# Patient Record
Sex: FEMALE | Race: BLACK OR AFRICAN AMERICAN
[De-identification: names, ages, dates, MRNs, and addresses within clinical notes are randomized per-mention and may not be internally consistent; named-entity substitution may affect disease eponyms.]

---

## 2018-05-09 ENCOUNTER — HOSPITAL ENCOUNTER (INPATIENT)
Dept: HOSPITAL 97 - ER | Age: 73
LOS: 9 days | Discharge: NURSING, REHAB OR LONG-TERM CARE | DRG: 603 | End: 2018-05-18
Attending: FAMILY MEDICINE | Admitting: HOSPITALIST
Payer: COMMERCIAL

## 2018-05-09 VITALS — BODY MASS INDEX: 31.7 KG/M2

## 2018-05-09 DIAGNOSIS — Z86.73: ICD-10-CM

## 2018-05-09 DIAGNOSIS — I95.9: ICD-10-CM

## 2018-05-09 DIAGNOSIS — E66.01: ICD-10-CM

## 2018-05-09 DIAGNOSIS — E05.90: ICD-10-CM

## 2018-05-09 DIAGNOSIS — I89.0: ICD-10-CM

## 2018-05-09 DIAGNOSIS — Z16.12: ICD-10-CM

## 2018-05-09 DIAGNOSIS — L03.115: ICD-10-CM

## 2018-05-09 DIAGNOSIS — I10: ICD-10-CM

## 2018-05-09 DIAGNOSIS — E87.3: ICD-10-CM

## 2018-05-09 DIAGNOSIS — N39.0: ICD-10-CM

## 2018-05-09 DIAGNOSIS — L03.116: Primary | ICD-10-CM

## 2018-05-09 DIAGNOSIS — I48.2: ICD-10-CM

## 2018-05-09 DIAGNOSIS — B96.20: ICD-10-CM

## 2018-05-09 DIAGNOSIS — J41.8: ICD-10-CM

## 2018-05-09 LAB
BUN BLD-MCNC: 23 MG/DL (ref 6–20)
GLUCOSE SERPLBLD-MCNC: 118 MG/DL (ref 65–120)
HCT VFR BLD CALC: 28.9 % (ref 36–45)
LYMPHOCYTES # SPEC AUTO: 1.4 K/UL (ref 0.7–4.9)
MCH RBC QN AUTO: 27.5 PG (ref 27–35)
MCV RBC: 83.6 FL (ref 80–100)
PMV BLD: 8.6 FL (ref 7.6–11.3)
POTASSIUM SERPL-SCNC: 3.5 MEQ/L (ref 3.6–5)
RBC # BLD: 3.46 M/UL (ref 3.86–4.86)

## 2018-05-09 PROCEDURE — 82962 GLUCOSE BLOOD TEST: CPT

## 2018-05-09 PROCEDURE — 82805 BLOOD GASES W/O2 SATURATION: CPT

## 2018-05-09 PROCEDURE — 71045 X-RAY EXAM CHEST 1 VIEW: CPT

## 2018-05-09 PROCEDURE — 85025 COMPLETE CBC W/AUTO DIFF WBC: CPT

## 2018-05-09 PROCEDURE — 81015 MICROSCOPIC EXAM OF URINE: CPT

## 2018-05-09 PROCEDURE — 84100 ASSAY OF PHOSPHORUS: CPT

## 2018-05-09 PROCEDURE — 84132 ASSAY OF SERUM POTASSIUM: CPT

## 2018-05-09 PROCEDURE — 83735 ASSAY OF MAGNESIUM: CPT

## 2018-05-09 PROCEDURE — 87077 CULTURE AEROBIC IDENTIFY: CPT

## 2018-05-09 PROCEDURE — 96375 TX/PRO/DX INJ NEW DRUG ADDON: CPT

## 2018-05-09 PROCEDURE — 87086 URINE CULTURE/COLONY COUNT: CPT

## 2018-05-09 PROCEDURE — 87186 SC STD MICRODIL/AGAR DIL: CPT

## 2018-05-09 PROCEDURE — 80053 COMPREHEN METABOLIC PANEL: CPT

## 2018-05-09 PROCEDURE — 96365 THER/PROPH/DIAG IV INF INIT: CPT

## 2018-05-09 PROCEDURE — 87088 URINE BACTERIA CULTURE: CPT

## 2018-05-09 PROCEDURE — 99285 EMERGENCY DEPT VISIT HI MDM: CPT

## 2018-05-09 PROCEDURE — 86140 C-REACTIVE PROTEIN: CPT

## 2018-05-09 PROCEDURE — 87040 BLOOD CULTURE FOR BACTERIA: CPT

## 2018-05-09 PROCEDURE — 36415 COLL VENOUS BLD VENIPUNCTURE: CPT

## 2018-05-09 PROCEDURE — 81003 URINALYSIS AUTO W/O SCOPE: CPT

## 2018-05-09 PROCEDURE — 97163 PT EVAL HIGH COMPLEX 45 MIN: CPT

## 2018-05-09 PROCEDURE — 80048 BASIC METABOLIC PNL TOTAL CA: CPT

## 2018-05-09 RX ADMIN — HUMAN INSULIN SCH: 100 INJECTION, SOLUTION SUBCUTANEOUS at 21:36

## 2018-05-09 RX ADMIN — Medication SCH ML: at 22:00

## 2018-05-09 NOTE — P.HP
Certification for Inpatient


Patient admitted to: Observation


With expected LOS: <2 Midnights


Patient will require the following post-hospital care: None


Practitioner: I am a practitioner with admitting privileges, knowledge of 

patient current condition, hospital course, and medical plan of care.


Services: Services provided to patient in accordance with Admission 

requirements found in Title 42 Section 412.3 of the Code of Federal Regulations





Patient History


Date of Service: 05/09/18


Primary Care Provider: None


Reason for admission: Cellulitis 


History of Present Illness: 





72-year-old female with significant past medical history of hypertension, AFib, 

COPD, history of CVA, who presented to the ER complaining of having some 

bilateral swelling has gotten worse over 1-2 weeks.  Patient stated that she 

started noticing a healer sore on the right heel which started hurting her more 

and her pain was radiating down from her breast leg all the way up to her 

pelvis area.  Patient does decided to come to the ER to get a further checked 

out.  Patient denies having any nausea vomiting fever chills or any other 

associated symptoms at this time.  Aside from bilateral swelling and pain.  

Patient does not have any other complaints to offer.  Patient is a poor 

historian who live by herself at home and does not have a good followup as 

well.  Patient it was a nursing home for rehab however was discharged from the 

nursing home when she was appropriate from rehab.





In the ER patient was found to have bilateral lower extremity cellulitis with 

mildly elevated white count and thus was referred over for admission for 

further care. 


Allergies





codeine Allergy (Mild, Verified 09/02/15 16:09)


 Rash


No Known Allergies Allergy (Uncoded 09/10/16 17:37)


 Unknown





Home Medications: 








Carvedilol [Coreg*] 25 mg PO BID 09/02/15 


Cyanocobalamin [Vitamin B-12*] 1,000 mcg IJ ONCE 09/02/15 


Nitroglycerin [Nitrostat*] 1 tab SL PRN PRN 09/02/15 


Tramadol HCl [Ultram] 50 mg PO BID PRN 09/02/15 


Triamcinolone 0.1% Crm [Kenalog 0.1% Cream*] 15 appl TOP TID 09/02/15 


Carvedilol [Coreg*] 6.25 mg PO BID #60 tab 03/26/18 


Furosemide [Lasix*] 40 mg PO DAILY #30 tab 03/26/18 


Ipratropium Neb [Atrovent*] 0.5 mg NEB Q6HP PRN #30 amp 03/26/18 


Isosorbide Mononitrate [Isosorbide Mononitrate ER] 60 mg PO DAILY #30 

tab.er.24h 03/26/18 


Mometasone/Formoterol [Dulera 100 Mcg/5 Mcg Inhaler] 2 puff IH BID #1 inhaler 03 /26/18 


Pantoprazole [Protonix Tab*] 40 mg PO DAILYAC #30 tab 03/26/18 


Potassium Chloride [Klor-Con] 40 meq PO DAILY #60 packet 03/26/18 


Rivaroxaban [Xarelto*] 20 mg PO DAILY #30 tablet 03/26/18 


Smz./Tmp. [Bactrim Ds 800 MG/160 MG*] 1 tab PO BID #20 tab 03/26/18 


Tizanidine [Zanaflex*] 2 mg PO BID #60 tab 03/26/18 








- Past Medical/Surgical History


Diabetic: Yes


-: stroke with left sided weakness


-: COPD


-: Venous insufficiency


-: benign hypertension


-: peripheral neuropathy


-: lymphedema


-: urinary incontinence


-: tubal ligation


-: myomectomy


-: bowel obstruction surgery x 2


-: sialolith-s/p excision.





- Family History


  ** Father


-: Heart disease





- Social History


Alcohol use: No


CD- Drugs: No


Caffeine use: No





Review of Systems


General: As per HPI





Physical Examination





- Physical Exam


General: Alert, In no apparent distress, Oriented x3


HEENT: Atraumatic


Neck: Supple


Respiratory: Clear to auscultation bilaterally, Normal air movement


Cardiovascular: Regular rate/rhythm, Normal S1 S2


Gastrointestinal: Normal bowel sounds, Soft and benign, Non-distended, No 

tenderness


Musculoskeletal: Swelling (Swelling BL LE. Erythema noted BL LE as well. No 

active wound noted on the left. Heel Ulcer noted on the right stage 2. )


Integumentary: No rashes, Tenderness/swelling, Erythema


Neurological: Normal speech, Normal strength at 5/5 x4 extr, Normal tone


Lymphatics: No axilla or inguinal lymphadenopathy





Assessment and Plan





- Problems (Diagnosis)


(1) Cellulitis


Current Visit: Yes   Status: Acute   


Plan: 


BL LE cellulitis most likely 2/2 to chronic swelling 


-IV vanc and zosyn 


-MRI to rule out osteomylitis on the right heel 


-Blood cutlure pending


Qualifiers: 


   Site of cellulitis: extremity   Site of cellulitis of extremity: lower 

extremity   Laterality: unspecified laterality   Qualified Code(s): L03.119 - 

Cellulitis of unspecified part of limb   





(2) Lymphedema


Current Visit: No   Status: Chronic   





(3) Atrial fibrillation


Onset Date: 03/19/18   Current Visit: No   Status: Chronic   


Qualifiers: 


   Atrial fibrillation type: chronic   Qualified Code(s): I48.2 - Chronic 

atrial fibrillation   





(4) COPD (chronic obstructive pulmonary disease)


Onset Date: 03/19/18   Current Visit: No   Status: Chronic   


Qualifiers: 


   COPD type: chronic bronchitis   Chronic bronchitis type: mixed simple and 

mucopurulent   Qualified Code(s): J41.8 - Mixed simple and mucopurulent chronic 

bronchitis   





(5) History of CVA (cerebrovascular accident)


Onset Date: 03/19/18   Current Visit: No   Status: Chronic   





(6) Hypertension


Onset Date: 03/19/18   Current Visit: No   Status: Chronic   


Qualifiers: 


   Hypertension type: essential hypertension 





(7) Hyperthyroidism


Onset Date: 03/19/18   Current Visit: No   Status: Chronic   





(8) Obesity


Onset Date: 03/19/18   Current Visit: No   Status: Chronic   


Qualifiers: 


   Obesity type: due to excess calories   Obesity classification: adult class 3 

(BMI >= 40)   Serious obesity comorbidity presence: with serious comorbidity   

Body mass index: BMI 45.0-49.9   Qualified Code(s): E66.01 - Morbid (severe) 

obesity due to excess calories; Z68.42 - Body mass index (BMI) 45.0-49.9, adult

   


Discharge Plan: Home


Plan to discharge in: 24 Hours





- Advance Directives


Does patient have a Living Will: No


Does patient have a Durable POA for Healthcare: No





- Code Status/Comfort Care


Code Status Assessed: Yes


Critical Care: No

## 2018-05-09 NOTE — ER
Nurse's Notes                                                                                     

 [facility]                                                                

Name: Maryellen Nolan                                                                                   

Age: 72 yrs                                                                                       

Sex: Female                                                                                       

: 1945                                                                                   

MRN: [id number]                                                                                   

Arrival Date: 2018                                                                          

Time: 16:16                                                                                       

Account#: [id number]                                                                            

Bed 7                                                                                             

Private MD:                                                                                       

Diagnosis: Cellulitis right lower extremity, mutlipe stage one decub (right foot and bottock,     

  chronic left shoulder pain                                                                      

                                                                                                  

Presentation:                                                                                     

                                                                                             

16:08 Presenting complaint: EMS states: c/o nausea and left shoulder pain. Afebrile. BP 99/53 sv  

      HR 88 99% RA BS-129, 20G L AC. Transition of care: patient was not received from            

      another setting of care. Onset of symptoms was May 08, 2018. Care prior to arrival: IV      

      initiated. 20 GA, in the left antecubital area, Glucose check: 129.                         

16:08 Method Of Arrival: EMS: [facility]                                                sv  

16:08 Acuity: MEHUL 4                                                                           sv  

16:09 Initial Sepsis Screen: Does the patient meet any 2 criteria? No. Patient's initial      sv  

      sepsis screen is negative. Does the patient have a suspected source of infection? No.       

      Patient's initial sepsis screen is negative.                                                

                                                                                                  

Triage Assessment:                                                                                

19:00 GI: Reports nausea.                                                                     bp  

19:00 General: Appears in no apparent distress. comfortable.                                  bp  

                                                                                                  

Historical:                                                                                       

- Allergies:                                                                                      

16:19 Codeine;                                                                                sv  

- Home Meds:                                                                                      

19:44 carvedilol 25 mg Oral tab [Active]; furosemide 40 mg/5 mL Oral soln [Active];           bp  

      gabapentin 600 mg Oral tab [Active]; isosorbide mononitrate 60 mg Oral Tb24 [Active];       

      lisinopril 20 mg Oral tab [Active]; nortriptyline 75 mg Oral cap [Active]; Potassium        

      Chloride Oral [Active]; tramadol 50 mg Oral tab [Active]; Xarelto 20 mg Oral tab            

      [Active];                                                                                   

- PMHx:                                                                                           

16:19 CHF; CVA; Hypertension; Lupus; neuropathy;                                              sv  

                                                                                                  

- Immunization history:: Adult Immunizations up to date.                                          

- Social history:: Smoking status: unknown.                                                       

                                                                                                  

                                                                                                  

Screenin:30 Abuse screen: Denies threats or abuse. Denies injuries from another. Nutritional        sg  

      screening: No deficits noted. Tuberculosis screening: No symptoms or risk factors           

      identified. Never had TB. Fall Risk None identified.                                        

                                                                                                  

Assessment:                                                                                       

16:30 General: Appears in no apparent distress. comfortable, well groomed, well developed,    sg  

      well nourished, Behavior is calm, cooperative, appropriate for age. Pain: Complains of      

      pain in right leg and anterior aspect of right ankle and medial aspect of right calf.       

      Neuro: Level of Consciousness is awake, alert, obeys commands, Oriented to person,          

      place, time,  are equal bilaterally Moves all extremities. Full function Speech is     

      normal, Facial symmetry appears normal. Cardiovascular: Heart tones S1 S2 present           

      Capillary refill is brisk in bilateral fingers Patient's skin is warm and dry. Chest        

      pain is denied. Cardiovascular: Edema pitting to left ankle, left foot, left toes,          

      right ankle, right foot and right toes. Respiratory: Airway is patent Respiratory           

      effort is even, unlabored, Respiratory pattern is regular, symmetrical. GI: Abdomen is      

      round non-distended, Bowel sounds present X 4 quads. Abd is soft X 4 quads. : No          

      signs and/or symptoms were reported regarding the genitourinary system. EENT: No signs      

      and/or symptoms were reported regarding the EENT system. Derm: Skin is intact, is           

      healthy with good turgor, Skin is dry, Skin is normal, Skin temperature is warm Rash        

      noted that is red, on right calf, right shin, left calf and left shin. Musculoskeletal:     

      No signs and/or symptoms reported regarding the musculoskeletal system.                     

17:30 Reassessment: Patient appears in no apparent distress at this time. Patient and/or      sg  

      family updated on plan of care and expected duration. Pain level reassessed. Patient is     

      alert, oriented x 3, equal unlabored respirations, skin warm/dry/pink. pt crying at         

      this time, awaiting orders from ERP , will continue to monitor.                   

18:10 Reassessment: Patient appears in no apparent distress at this time. Patient and/or      sg  

      family updated on plan of care and expected duration. Pain level reassessed. Patient is     

      alert, oriented x 3, equal unlabored respirations, skin warm/dry/pink. awaiting orders      

      from ED provider at this time.                                                              

18:30 Reassessment:  at bedside with pt at this time.                                 sg  

19:00 Reassessment: RECD REPORT FROM DUNACN ZAVALA. 71YO BF P/W NAUSEA AND CHRONIC SHOULDER      bp  

      PAIN. ADMIT IN PROCESS FOR CELLULITIS.                                                      

                                                                                                  

Vital Signs:                                                                                      

16:20  / 47; Pulse 90; Resp 18; Temp 97.6(O); Pulse Ox 96% on R/A; Height 5 ft. 4 in.   sv  

      (162.56 cm) (R); Pain 5/10;                                                                 

17:00  / 48; Pulse 95; Resp 18; Pulse Ox 96% ;                                          sv  

18:00 BP 94 / 70; Pulse 101; Resp 18; Pulse Ox 96% ;                                          sv  

19:00  / 48; Pulse 101; Resp 18; Pulse Ox 97% ;                                         sv  

20:00 BP 86 / 42; Pulse 92; Resp 16; Pulse Ox 97% ;                                           bp  

20:41 BP 99 / 48; Pulse 99; Resp 16; Pulse Ox 97% ;                                           bp  

                                                                                                  

ED Course:                                                                                        

16:16 Patient arrived in ED.                                                                  sv  

16:18 Triage completed.                                                                       sv  

16:19 Arm band placed on left wrist.                                                          sv  

16:19 Maintain EMS IV. Dressing intact. Site clean \T\ dry. Gauge \T\ site: 20G L AC.             sv

16:30 Patient has correct armband on for positive identification. Bed in low position. Call   sg  

      light in reach. Side rails up X2. Pulse ox on. NIBP on. Head of bed elevated. Elevated      

      right left leg.                                                                             

16:46 John Briceno MD is Attending Physician.                                              kdr 

16:48 Frank Lopez, RN is Primary Nurse.                                                       sg  

18:10 Nolberto Zhong MD is Hospitalizing Provider.                                           kdr 

18:15 Initial lab(s) drawn, by me, sent to lab. First set of blood cultures drawn by me.      sv  

      Inserted saline lock: 22 gauge in right forearm, using aseptic technique. ,using            

      aseptic technique. diffusics Blood collected. Flushed right forearm with 5 ml normal        

      saline.                                                                                     

18:30 Second set of blood cultures drawn by me.                                               sv  

19:00 No provider procedures requiring assistance completed.                                  sg  

19:04 Primary Nurse role handed off by Frank Lopez, SALLY                                        bp  

19:04 Wojciech Gaines, SALLY is Primary Nurse.                                                    bp  

19:45 Patient admitted, IV remains in place.                                                  bp  

                                                                                                  

Administered Medications:                                                                         

19:00 Drug: Clindamycin 600 mg Route: IVPB; Infused Over: 30 mins; Site: right forearm;       sv  

19:46 Follow up: IV Status: Completed infusion                                                bp  

19:00 Drug: Bactrim (160 mg-800 mg (DS) 1 tablet Route: PO;                                   sv  

19:48 Follow up: Response: No adverse reaction                                                bp  

19:00 Drug: morphine 2 mg Route: IVP; Site: right forearm;                                    sv  

19:48 Follow up: Response: No adverse reaction                                                bp  

19:00 Drug: Zofran 4 mg Route: IVP; Site: right forearm;                                      sv  

19:48 Follow up: Response: No adverse reaction                                                bp  

                                                                                                  

                                                                                                  

Outcome:                                                                                          

18:12 Decision to Hospitalize by Provider.                                                    kdr 

19:45 Condition: stable                                                                       bp  

19:45 Instructed on the need for admit.                                                           

20:40 Admitted to Med/surg accompanied by tech, via stretcher, room 404, with chart, Report   bp  

      called to  NICOLE ZAVALA                                                                        

21:12 Patient left the ED.                                                                    bp  

                                                                                                  

Signatures:                                                                                       

Jeannie Barros RN                    RN   Frank Grimes RN RN sg Rittger, Kevin, MD MD   kdr                                                  

Wojciech Gaines RN                      RN   bp                                                   

                                                                                                  

**************************************************************************************************

## 2018-05-09 NOTE — EDPHYS
Physician Documentation                                                                           

 [facility]                                                                

Name: Maryellen Nolan                                                                                   

Age: 72 yrs                                                                                       

Sex: Female                                                                                       

: 1945                                                                                   

MRN: [id number]                                                                                   

Arrival Date: 2018                                                                          

Time: 16:16                                                                                       

Account#: [id number]                                                                            

Bed 7                                                                                             

Private MD:                                                                                       

ED Physician John Briceno                                                                       

HPI:                                                                                              

                                                                                             

18:33 This 72 yrs old Black Female presents to ER via EMS with complaints of Nausea, Shoulder kdr 

      Pain \T\ right leg edema./cellulitis.                                                       

18:34 The patient has multiple complaints including left shoulder (chronic) , n/v, right leg  kdr 

      swelling and warmth, stage I decub on right heal and huttock. Onset: The                    

      symptoms/episode began/occurred gradually, 3 day(s) ago. Severity of symptoms: At their     

      worst the symptoms were mild moderate just prior to arrival. The patient has                

      experienced similar episodes in the past, a few times. The patient has been recently        

      seen by a physician: The patient has been recently been admitted at [facility], a few weeks ago.                                                             

                                                                                                  

Historical:                                                                                       

- Allergies:                                                                                      

16:19 Codeine;                                                                                sv  

- Home Meds:                                                                                      

19:44 carvedilol 25 mg Oral tab [Active]; furosemide 40 mg/5 mL Oral soln [Active];           bp  

      gabapentin 600 mg Oral tab [Active]; isosorbide mononitrate 60 mg Oral Tb24 [Active];       

      lisinopril 20 mg Oral tab [Active]; nortriptyline 75 mg Oral cap [Active]; Potassium        

      Chloride Oral [Active]; tramadol 50 mg Oral tab [Active]; Xarelto 20 mg Oral tab            

      [Active];                                                                                   

- PMHx:                                                                                           

16:19 CHF; CVA; Hypertension; Lupus; neuropathy;                                              sv  

                                                                                                  

- Immunization history:: Adult Immunizations up to date.                                          

- Social history:: Smoking status: unknown.                                                       

                                                                                                  

                                                                                                  

ROS:                                                                                              

18:34 Constitutional: Negative for fever, chills, and weight loss, Eyes: Negative for injury, kdr 

      pain, redness, and discharge, ENT: Negative for injury, pain, and discharge, Neck:          

      Negative for injury, pain, and swelling, Cardiovascular: Negative for chest pain,           

      palpitations, and edema, Respiratory: Negative for shortness of breath, cough,              

      wheezing, and pleuritic chest pain, Abdomen/GI: Negative for abdominal pain, nausea,        

      vomiting, diarrhea, and constipation, Back: Negative for injury and pain, : Negative      

      for injury, bleeding, discharge, and swelling, Neuro: Negative for headache, weakness,      

      numbness, tingling, and seizure activity. Psych: Negative for depression, anxiety,          

      suicide ideation, homicidal ideation, and hallucinations, Allergy/Immunology: Negative      

      for hives, rash, and allergies, Endocrine: Negative for neck swelling, polydipsia,          

      polyuria, polyphagia, and marked weight changes.                                            

18:34 MS/extremity: Positive for pain, swelling, tenderness, warmth, of the lateral aspect of     

      right calf, right ankle, right calf, right Achilles, medial aspect of right calf, right     

      shin and anterior aspect of right ankle.                                                    

                                                                                                  

Exam:                                                                                             

18:34 Constitutional:  This is a well developed, well nourished patient who is awake, alert,  kdr 

      and in no acute distress. Head/Face:  Normocephalic, atraumatic. Eyes:  Pupils equal        

      round and reactive to light, extra-ocular motions intact.  Lids and lashes normal.          

      Conjunctiva and sclera are non-icteric and not injected.  Cornea within normal limits.      

      Periorbital areas with no swelling, redness, or edema. Neck:  Trachea midline, no           

      thyromegaly or masses palpated, and no cervical lymphadenopathy.  Supple, full range of     

      motion without nuchal rigidity, or vertebral point tenderness.  No Meningismus.             

      Chest/axilla:  Normal chest wall appearance and motion.  Nontender with no deformity.       

      No lesions are appreciated. Cardiovascular:  Regular rate and rhythm with a normal S1       

      and S2.  No gallops, murmurs, or rubs.  Normal PMI, no JVD.  No pulse deficits.             

      Respiratory:  Lungs have equal breath sounds bilaterally, clear to auscultation and         

      percussion.  No rales, rhonchi or wheezes noted.  No increased work of breathing, no        

      retractions or nasal flaring. Abdomen/GI:  Soft, non-tender, with normal bowel sounds.      

      No distension or tympany.  No guarding or rebound.  No evidence of tenderness               

      throughout. Back:  No spinal tenderness.  No costovertebral tenderness.  Full range of      

      motion. Skin:  Warm, dry with normal turgor.  Normal color with no rashes, no lesions,      

      and no evidence of cellulitis. Neuro:  Awake and alert, GCS 15, oriented to person,         

      place, time, and situation.  Cranial nerves II-XII grossly intact.  Motor strength 5/5      

      in all extremities.  Sensory grossly intact.  Cerebellar exam normal.  Normal gait.         

      Psych:  Awake, alert, with orientation to person, place and time.  Behavior, mood, and      

      affect are within normal limits.                                                            

18:34 Skin: cellulitis, that is mild, confluent, on the  lateral aspect of right calf, right      

      ankle, right calf, right Achilles, medial aspect of right calf, right shin and anterior     

      aspect of right ankle, induration, that is moderate is noted, Chronic bilateral             

      lymphedema.                                                                                 

                                                                                                  

Vital Signs:                                                                                      

16:20  / 47; Pulse 90; Resp 18; Temp 97.6(O); Pulse Ox 96% on R/A; Height 5 ft. 4 in.   sv  

      (162.56 cm) (R); Pain 5/10;                                                                 

17:00  / 48; Pulse 95; Resp 18; Pulse Ox 96% ;                                          sv  

18:00 BP 94 / 70; Pulse 101; Resp 18; Pulse Ox 96% ;                                          sv  

19:00  / 48; Pulse 101; Resp 18; Pulse Ox 97% ;                                         sv  

20:00 BP 86 / 42; Pulse 92; Resp 16; Pulse Ox 97% ;                                           bp  

20:41 BP 99 / 48; Pulse 99; Resp 16; Pulse Ox 97% ;                                           bp  

                                                                                                  

MDM:                                                                                              

18:12 Patient medically screened.                                                             kdr 

18:34 Data reviewed: vital signs, nurses notes, lab test result(s). Counseling: I had a       kdr 

      detailed discussion with the patient and/or guardian regarding: the historical points,      

      exam findings, and any diagnostic results supporting the discharge/admit diagnosis, lab     

      results, the need for further work-up and treatment in the hospital.                        

                                                                                                  

                                                                                             

17:51 Order name: CBC with Diff                                                               kdr 

                                                                                             

17:51 Order name: Chem 7                                                                      kdr 

                                                                                             

17:51 Order name: Blood Culture Adult (2)                                                     kdr 

                                                                                             

20:02 Order name: C-Reactive Protein                                                          EDMS

                                                                                                  

Administered Medications:                                                                         

19:00 Drug: Clindamycin 600 mg Route: IVPB; Infused Over: 30 mins; Site: right forearm;       sv  

19:46 Follow up: IV Status: Completed infusion                                                bp  

19:00 Drug: Bactrim (160 mg-800 mg (DS) 1 tablet Route: PO;                                   sv  

19:48 Follow up: Response: No adverse reaction                                                bp  

19:00 Drug: morphine 2 mg Route: IVP; Site: right forearm;                                    sv  

19:48 Follow up: Response: No adverse reaction                                                bp  

19:00 Drug: Zofran 4 mg Route: IVP; Site: right forearm;                                      sv  

19:48 Follow up: Response: No adverse reaction                                                bp  

                                                                                                  

                                                                                                  

Disposition:                                                                                      

18 18:12 Hospitalization ordered by Nolberto Zhogn for Inpatient Admission. Preliminary     

  diagnosis is Cellulitis right lower extremity, mutlipe stage one decub (right                   

  foot and bottock, chronic left shoulder pain.                                                   

- Bed requested for Telemetry/MedSurg (Inpatient).                                                

- Status is Inpatient Admission.                                                              bp  

- Condition is Fair.                                                                              

- Problem is an acute exacerbation.                                                               

- Symptoms are unchanged.                                                                         

UTI on Admission? No                                                                              

                                                                                                  

                                                                                                  

                                                                                                  

Signatures:                                                                                       

Dispatcher [employer]                           EDMS                                                 

Griselda Sutton Stephanie, RN                    Callie Foster RN RN dw Gay, Steven, RN RN sg Rittger, Kevin, MD MD   [facility]                                                  

Wojciech Gaines RN                      RN   bp                                                   

                                                                                                  

Corrections: (The following items were deleted from the chart)                                    

18:53 18:12 Hospitalization Ordered by Nolberto Zhong MD for Inpatient Admission. Preliminary  bd  

      diagnosis is Cellulitis right lower extremity, mutlipe stage one decub (right foot and      

      bottock, chronic left shoulder pain. Bed requested for Telemetry/MedSurg (Inpatient).       

      Status is Inpatient Admission. Condition is Fair. Problem is an acute exacerbation.         

      Symptoms are unchanged. UTI on Admission? No. kdr                                           

18:53 18:53 2018 18:12 Hospitalization Ordered by Nolberto Zhong MD for Inpatient        dw  

      Admission. Preliminary diagnosis is Cellulitis right lower extremity, mutlipe stage one     

      decub (right foot and bottock, chronic left shoulder pain. Bed requested for                

      Telemetry/MedSurg (Inpatient). Status is Inpatient Admission. Condition is Fair.            

      Problem is an acute exacerbation. Symptoms are unchanged. UTI on Admission? No. bd          

21:12 18:53 2018 18:12 Hospitalization Ordered by Nolberto Zhong MD for Inpatient        bp  

      Admission. Preliminary diagnosis is Cellulitis right lower extremity, mutlipe stage one     

      decub (right foot and bottock, chronic left shoulder pain. Bed requested for                

      Telemetry/MedSurg (Inpatient). Status is Inpatient Admission. Condition is Fair.            

      Problem is an acute exacerbation. Symptoms are unchanged. UTI on Admission? No. dw          

                                                                                                  

**************************************************************************************************

## 2018-05-10 LAB
ALBUMIN SERPL BCP-MCNC: 2.5 G/DL (ref 3.2–5.5)
ALP SERPL-CCNC: 28 IU/L (ref 42–121)
ALT SERPL W P-5'-P-CCNC: 14 IU/L (ref 10–60)
AST SERPL W P-5'-P-CCNC: 18 IU/L (ref 10–42)
BUN BLD-MCNC: 22 MG/DL (ref 6–20)
GLUCOSE SERPLBLD-MCNC: 107 MG/DL (ref 65–120)
HCT VFR BLD CALC: 29 % (ref 36–45)
LYMPHOCYTES # SPEC AUTO: 1.3 K/UL (ref 0.7–4.9)
MAGNESIUM SERPL-MCNC: 1.5 MG/DL (ref 1.8–2.5)
MCH RBC QN AUTO: 26.8 PG (ref 27–35)
MCV RBC: 83.8 FL (ref 80–100)
PMV BLD: 9.1 FL (ref 7.6–11.3)
POTASSIUM SERPL-SCNC: 3.8 MEQ/L (ref 3.6–5)
RBC # BLD: 3.46 M/UL (ref 3.86–4.86)
UA COMPLETE W REFLEX CULTURE PNL UR: (no result)
UA DIPSTICK W REFLEX MICRO PNL UR: (no result)

## 2018-05-10 RX ADMIN — HYDROCODONE BITARTRATE AND ACETAMINOPHEN PRN TAB: 10; 325 TABLET ORAL at 14:52

## 2018-05-10 RX ADMIN — CARVEDILOL SCH: 25 TABLET, FILM COATED ORAL at 21:00

## 2018-05-10 RX ADMIN — PIPERACILLIN SODIUM AND TAZOBACTAM SODIUM SCH MLS: 3; .375 INJECTION, POWDER, LYOPHILIZED, FOR SOLUTION INTRAVENOUS at 01:12

## 2018-05-10 RX ADMIN — FUROSEMIDE SCH MG: 40 TABLET ORAL at 10:48

## 2018-05-10 RX ADMIN — CARVEDILOL SCH MG: 25 TABLET, FILM COATED ORAL at 10:01

## 2018-05-10 RX ADMIN — HYDROCODONE BITARTRATE AND ACETAMINOPHEN PRN TAB: 10; 325 TABLET ORAL at 03:29

## 2018-05-10 RX ADMIN — FUROSEMIDE SCH MG: 40 TABLET ORAL at 17:12

## 2018-05-10 RX ADMIN — HUMAN INSULIN SCH: 100 INJECTION, SOLUTION SUBCUTANEOUS at 21:00

## 2018-05-10 RX ADMIN — PIPERACILLIN SODIUM AND TAZOBACTAM SODIUM SCH MLS: 3; .375 INJECTION, POWDER, LYOPHILIZED, FOR SOLUTION INTRAVENOUS at 09:58

## 2018-05-10 RX ADMIN — CYANOCOBALAMIN TAB 1000 MCG SCH MCG: 1000 TAB at 09:59

## 2018-05-10 RX ADMIN — LISINOPRIL SCH: 20 TABLET ORAL at 09:00

## 2018-05-10 RX ADMIN — HUMAN INSULIN SCH: 100 INJECTION, SOLUTION SUBCUTANEOUS at 11:30

## 2018-05-10 RX ADMIN — HUMAN INSULIN SCH: 100 INJECTION, SOLUTION SUBCUTANEOUS at 07:30

## 2018-05-10 RX ADMIN — MONTELUKAST SODIUM SCH MG: 10 TABLET, FILM COATED ORAL at 09:59

## 2018-05-10 RX ADMIN — Medication SCH ML: at 10:00

## 2018-05-10 RX ADMIN — PIPERACILLIN SODIUM AND TAZOBACTAM SODIUM SCH MLS: 3; .375 INJECTION, POWDER, LYOPHILIZED, FOR SOLUTION INTRAVENOUS at 17:11

## 2018-05-10 RX ADMIN — HUMAN INSULIN SCH: 100 INJECTION, SOLUTION SUBCUTANEOUS at 16:29

## 2018-05-10 RX ADMIN — Medication SCH ML: at 21:04

## 2018-05-10 RX ADMIN — RIVAROXABAN SCH MG: 20 TABLET, FILM COATED ORAL at 17:12

## 2018-05-10 RX ADMIN — PIPERACILLIN SODIUM AND TAZOBACTAM SODIUM SCH MLS: 3; .375 INJECTION, POWDER, LYOPHILIZED, FOR SOLUTION INTRAVENOUS at 05:41

## 2018-05-10 RX ADMIN — Medication SCH PKT: at 20:59

## 2018-05-10 RX ADMIN — ISOSORBIDE MONONITRATE SCH MG: 60 TABLET, EXTENDED RELEASE ORAL at 09:59

## 2018-05-10 NOTE — P.PN
Subjective


Date of Service: 05/10/18


Primary Care Provider: None


Chief Complaint: Cellulitis 





Patient seen and examined at bedside with RN.  Case discussed with family.  

Currently patient is doing well.  States that she feels much better than before.





Review of Systems


General: As per HPI





Physical Examination





- Vital Signs


Temperature: 98.0 F


Blood Pressure: 90/48


Pulse: 96


Respirations: 16


Pulse Ox (%): 89





- Physical Exam


General: Alert, In no apparent distress


HEENT: Atraumatic, PERRLA, EOMI


Neck: Supple, JVD not distended


Respiratory: Clear to auscultation bilaterally, Normal air movement


Cardiovascular: Regular rate/rhythm, Normal S1 S2


Gastrointestinal: Normal bowel sounds, No tenderness


Musculoskeletal: Swelling (3+ swelling. Improved erythema ), Erythema, 

Tenderness


Integumentary: No rashes


Neurological: Normal speech, Normal tone, Normal affect


Lymphatics: No axilla or inguinal lymphadenopathy





- Studies


Medications List Reviewed: Yes





Assessment & Plan





- Problems (Diagnosis)


(1) Cellulitis


Onset Date: 05/10/18   Current Visit: Yes   Status: Acute   


Plan: 


BL LE cellulitis most likely 2/2 to chronic swelling 


-IV zosyn 


-MRI negaitve for osteomylitis 


-Ace wrap and elevate legs. 


-Blood cutlure pending


Qualifiers: 


   Site of cellulitis: extremity   Site of cellulitis of extremity: lower 

extremity   Laterality: unspecified laterality   Qualified Code(s): L03.119 - 

Cellulitis of unspecified part of limb   





(2) Lymphedema


Onset Date: 05/10/18   Current Visit: Yes   Status: Chronic   





(3) Atrial fibrillation


Onset Date: 03/19/18   Current Visit: No   Status: Chronic   


Qualifiers: 


   Atrial fibrillation type: chronic   Qualified Code(s): I48.2 - Chronic 

atrial fibrillation   





(4) COPD (chronic obstructive pulmonary disease)


Onset Date: 03/19/18   Current Visit: No   Status: Chronic   


Qualifiers: 


   COPD type: chronic bronchitis   Chronic bronchitis type: mixed simple and 

mucopurulent   Qualified Code(s): J41.8 - Mixed simple and mucopurulent chronic 

bronchitis   





(5) History of CVA (cerebrovascular accident)


Onset Date: 03/19/18   Current Visit: No   Status: Chronic   





(6) Hypertension


Onset Date: 03/19/18   Current Visit: No   Status: Chronic   


Qualifiers: 


   Hypertension type: essential hypertension 





(7) Hyperthyroidism


Onset Date: 03/19/18   Current Visit: No   Status: Chronic   





(8) Obesity


Onset Date: 03/19/18   Current Visit: No   Status: Chronic   


Qualifiers: 


   Obesity type: due to excess calories   Obesity classification: adult class 3 

(BMI >= 40)   Serious obesity comorbidity presence: with serious comorbidity   

Body mass index: BMI 45.0-49.9   Qualified Code(s): E66.01 - Morbid (severe) 

obesity due to excess calories; Z68.42 - Body mass index (BMI) 45.0-49.9, adult

   





(9) UTI (urinary tract infection)


Current Visit: No   Status: Acute   


Plan: 


UA + for EColi. 


-Urine culture pending 


-IV zosyn for now 





Qualifiers: 


   Urinary tract infection type: acute cystitis   Hematuria presence: without 

hematuria   Qualified Code(s): N30.00 - Acute cystitis without hematuria   


Discharge Plan: Home


Plan to discharge in: 24 Hours





- Code Status/Comfort Care


Code Status Assessed: Yes


Critical Care: No

## 2018-05-10 NOTE — RAD REPORT
EXAM DESCRIPTION:  MRIFoot (Right)

 

CLINICAL HISTORY:  Open sore on heel, assess for osteomyelitis.

 

COMPARISON:  None.

 

FINDINGS:  Ill-defined skin thickening and subcutaneous edematous tissue is seen about the heel poste
rior aspect as well as the plantar aspect of the broad posterior and plantar calcaneal spurs are seen
.

 

The underlying marrow pattern of the calcaneus remains normal with normal with normal fatty marrow T1
 signal and a lack of elevated T2/IR signal. This would indicate that osteomyelitis is not present at
 this time.

 

No drainable fluid collection seen. No fracture or subluxation seen.

 

IMPRESSION:  Negative for osteomyelitis.

## 2018-05-11 LAB
ALBUMIN SERPL BCP-MCNC: 2.6 G/DL (ref 3.2–5.5)
ALP SERPL-CCNC: 32 IU/L (ref 42–121)
ALT SERPL W P-5'-P-CCNC: 14 IU/L (ref 10–60)
AST SERPL W P-5'-P-CCNC: 21 IU/L (ref 10–42)
BUN BLD-MCNC: 18 MG/DL (ref 6–20)
GLUCOSE SERPLBLD-MCNC: 117 MG/DL (ref 65–120)
HCT VFR BLD CALC: 31.3 % (ref 36–45)
LYMPHOCYTES # SPEC AUTO: 1 K/UL (ref 0.7–4.9)
MAGNESIUM SERPL-MCNC: 1.5 MG/DL (ref 1.8–2.5)
MCH RBC QN AUTO: 27.4 PG (ref 27–35)
MCV RBC: 83.5 FL (ref 80–100)
PMV BLD: 9 FL (ref 7.6–11.3)
POTASSIUM SERPL-SCNC: 3.5 MEQ/L (ref 3.6–5)
RBC # BLD: 3.74 M/UL (ref 3.86–4.86)

## 2018-05-11 RX ADMIN — HUMAN INSULIN SCH: 100 INJECTION, SOLUTION SUBCUTANEOUS at 07:30

## 2018-05-11 RX ADMIN — Medication SCH ML: at 09:10

## 2018-05-11 RX ADMIN — CYANOCOBALAMIN TAB 1000 MCG SCH MCG: 1000 TAB at 09:11

## 2018-05-11 RX ADMIN — PIPERACILLIN SODIUM AND TAZOBACTAM SODIUM SCH MLS: 3; .375 INJECTION, POWDER, LYOPHILIZED, FOR SOLUTION INTRAVENOUS at 17:58

## 2018-05-11 RX ADMIN — CARVEDILOL SCH: 25 TABLET, FILM COATED ORAL at 21:00

## 2018-05-11 RX ADMIN — HUMAN INSULIN SCH: 100 INJECTION, SOLUTION SUBCUTANEOUS at 21:00

## 2018-05-11 RX ADMIN — FUROSEMIDE SCH MG: 40 TABLET ORAL at 09:11

## 2018-05-11 RX ADMIN — HUMAN INSULIN SCH: 100 INJECTION, SOLUTION SUBCUTANEOUS at 16:30

## 2018-05-11 RX ADMIN — HYDROCODONE BITARTRATE AND ACETAMINOPHEN PRN TAB: 10; 325 TABLET ORAL at 10:07

## 2018-05-11 RX ADMIN — Medication SCH ML: at 22:00

## 2018-05-11 RX ADMIN — PIPERACILLIN SODIUM AND TAZOBACTAM SODIUM SCH MLS: 3; .375 INJECTION, POWDER, LYOPHILIZED, FOR SOLUTION INTRAVENOUS at 00:45

## 2018-05-11 RX ADMIN — PIPERACILLIN SODIUM AND TAZOBACTAM SODIUM SCH MLS: 3; .375 INJECTION, POWDER, LYOPHILIZED, FOR SOLUTION INTRAVENOUS at 09:16

## 2018-05-11 RX ADMIN — Medication SCH PKT: at 22:00

## 2018-05-11 RX ADMIN — LISINOPRIL SCH MG: 20 TABLET ORAL at 09:10

## 2018-05-11 RX ADMIN — PANTOPRAZOLE SODIUM SCH MG: 40 TABLET, DELAYED RELEASE ORAL at 06:49

## 2018-05-11 RX ADMIN — HUMAN INSULIN SCH: 100 INJECTION, SOLUTION SUBCUTANEOUS at 11:30

## 2018-05-11 RX ADMIN — ISOSORBIDE MONONITRATE SCH MG: 60 TABLET, EXTENDED RELEASE ORAL at 09:11

## 2018-05-11 RX ADMIN — MONTELUKAST SODIUM SCH MG: 10 TABLET, FILM COATED ORAL at 09:16

## 2018-05-11 RX ADMIN — CARVEDILOL SCH MG: 25 TABLET, FILM COATED ORAL at 09:09

## 2018-05-11 RX ADMIN — FUROSEMIDE SCH MG: 40 TABLET ORAL at 16:51

## 2018-05-11 RX ADMIN — RIVAROXABAN SCH MG: 20 TABLET, FILM COATED ORAL at 16:54

## 2018-05-11 RX ADMIN — Medication SCH PKT: at 09:16

## 2018-05-11 RX ADMIN — HYDROCODONE BITARTRATE AND ACETAMINOPHEN PRN TAB: 10; 325 TABLET ORAL at 00:49

## 2018-05-11 NOTE — P.PN
Subjective


Date of Service: 05/11/18


Primary Care Provider: None


Chief Complaint: Cellulitis 





Patient seen and examined at bedside with RN.  Case discussed with family.  

Currently patient is doing well.  States that she feels much better than 

before. Urine culture + for gram - rods. 





Review of Systems


General: As per HPI





Physical Examination





- Vital Signs


Temperature: 97.4 F


Blood Pressure: 94/48


Pulse: 90


Respirations: 18


Pulse Ox (%): 93





- Physical Exam


General: Alert, In no apparent distress, Oriented x3


HEENT: Atraumatic, PERRLA, EOMI


Neck: Supple, JVD not distended


Respiratory: Clear to auscultation bilaterally, Normal air movement


Cardiovascular: Regular rate/rhythm, Normal S1 S2


Gastrointestinal: Normal bowel sounds, No tenderness


Musculoskeletal: Erythema, Tenderness, Warmth (BL 3+ edema. Still Warm to touch)


Integumentary: No rashes


Neurological: Normal speech, Normal tone, Normal affect


Lymphatics: No axilla or inguinal lymphadenopathy





- Studies


Medications List Reviewed: Yes





Assessment & Plan





- Problems (Diagnosis)


(1) Cellulitis


Onset Date: 05/10/18   Current Visit: Yes   Status: Acute   


Plan: 


BL LE cellulitis most likely 2/2 to chronic swelling. Improving slowly 


-IV zosyn 


-MRI negaitve for osteomylitis 


-Ace wrap and elevate legs. 


-Blood cutlure pending


Qualifiers: 


   Site of cellulitis: extremity   Site of cellulitis of extremity: lower 

extremity   Laterality: unspecified laterality   Qualified Code(s): L03.119 - 

Cellulitis of unspecified part of limb   





(2) UTI (urinary tract infection)


Current Visit: No   Status: Acute   


Plan: 


UA + for UTI


-Urine culture + for gram - rods


-IV zosyn for now 





Qualifiers: 


   Urinary tract infection type: acute cystitis   Hematuria presence: without 

hematuria   Qualified Code(s): N30.00 - Acute cystitis without hematuria   





(3) Lymphedema


Onset Date: 05/10/18   Current Visit: Yes   Status: Chronic   





(4) Atrial fibrillation


Onset Date: 03/19/18   Current Visit: No   Status: Chronic   


Qualifiers: 


   Atrial fibrillation type: chronic   Qualified Code(s): I48.2 - Chronic 

atrial fibrillation   





(5) COPD (chronic obstructive pulmonary disease)


Onset Date: 03/19/18   Current Visit: No   Status: Chronic   


Qualifiers: 


   COPD type: chronic bronchitis   Chronic bronchitis type: mixed simple and 

mucopurulent   Qualified Code(s): J41.8 - Mixed simple and mucopurulent chronic 

bronchitis   





(6) History of CVA (cerebrovascular accident)


Onset Date: 03/19/18   Current Visit: No   Status: Chronic   





(7) Hypertension


Onset Date: 03/19/18   Current Visit: No   Status: Chronic   


Qualifiers: 


   Hypertension type: essential hypertension 





(8) Hyperthyroidism


Onset Date: 03/19/18   Current Visit: No   Status: Chronic   





(9) Obesity


Onset Date: 03/19/18   Current Visit: No   Status: Chronic   


Qualifiers: 


   Obesity type: due to excess calories   Obesity classification: adult class 3 

(BMI >= 40)   Serious obesity comorbidity presence: with serious comorbidity   

Body mass index: BMI 45.0-49.9   Qualified Code(s): E66.01 - Morbid (severe) 

obesity due to excess calories; Z68.42 - Body mass index (BMI) 45.0-49.9, adult

## 2018-05-12 LAB
ALBUMIN SERPL BCP-MCNC: 2.3 G/DL (ref 3.2–5.5)
ALP SERPL-CCNC: 27 IU/L (ref 42–121)
ALT SERPL W P-5'-P-CCNC: 14 IU/L (ref 10–60)
AST SERPL W P-5'-P-CCNC: 21 IU/L (ref 10–42)
BUN BLD-MCNC: 16 MG/DL (ref 6–20)
GLUCOSE SERPLBLD-MCNC: 77 MG/DL (ref 65–120)
HCT VFR BLD CALC: 28.6 % (ref 36–45)
LYMPHOCYTES # SPEC AUTO: 1.7 K/UL (ref 0.7–4.9)
MAGNESIUM SERPL-MCNC: 1.6 MG/DL (ref 1.8–2.5)
MCH RBC QN AUTO: 26.8 PG (ref 27–35)
MCV RBC: 83.7 FL (ref 80–100)
PMV BLD: 9.1 FL (ref 7.6–11.3)
POTASSIUM SERPL-SCNC: 3.4 MEQ/L (ref 3.6–5)
RBC # BLD: 3.42 M/UL (ref 3.86–4.86)

## 2018-05-12 RX ADMIN — ACETAMINOPHEN PRN MG: 500 TABLET, FILM COATED ORAL at 13:36

## 2018-05-12 RX ADMIN — Medication SCH ML: at 22:08

## 2018-05-12 RX ADMIN — HUMAN INSULIN SCH: 100 INJECTION, SOLUTION SUBCUTANEOUS at 16:30

## 2018-05-12 RX ADMIN — PANTOPRAZOLE SODIUM SCH MG: 40 TABLET, DELAYED RELEASE ORAL at 05:52

## 2018-05-12 RX ADMIN — PIPERACILLIN SODIUM AND TAZOBACTAM SODIUM SCH MLS: 3; .375 INJECTION, POWDER, LYOPHILIZED, FOR SOLUTION INTRAVENOUS at 09:48

## 2018-05-12 RX ADMIN — CARVEDILOL SCH: 25 TABLET, FILM COATED ORAL at 09:00

## 2018-05-12 RX ADMIN — CARVEDILOL SCH MG: 25 TABLET, FILM COATED ORAL at 22:08

## 2018-05-12 RX ADMIN — HUMAN INSULIN SCH: 100 INJECTION, SOLUTION SUBCUTANEOUS at 21:00

## 2018-05-12 RX ADMIN — SODIUM CHLORIDE SCH MLS: 9 INJECTION, SOLUTION INTRAVENOUS at 17:20

## 2018-05-12 RX ADMIN — CYANOCOBALAMIN TAB 1000 MCG SCH MCG: 1000 TAB at 09:47

## 2018-05-12 RX ADMIN — GABAPENTIN PRN MG: 300 CAPSULE ORAL at 09:47

## 2018-05-12 RX ADMIN — HUMAN INSULIN SCH: 100 INJECTION, SOLUTION SUBCUTANEOUS at 07:30

## 2018-05-12 RX ADMIN — RIVAROXABAN SCH MG: 20 TABLET, FILM COATED ORAL at 17:18

## 2018-05-12 RX ADMIN — PIPERACILLIN SODIUM AND TAZOBACTAM SODIUM SCH MLS: 3; .375 INJECTION, POWDER, LYOPHILIZED, FOR SOLUTION INTRAVENOUS at 00:41

## 2018-05-12 RX ADMIN — FUROSEMIDE SCH MG: 40 TABLET ORAL at 17:18

## 2018-05-12 RX ADMIN — FUROSEMIDE SCH MG: 40 TABLET ORAL at 09:47

## 2018-05-12 RX ADMIN — ISOSORBIDE MONONITRATE SCH MG: 60 TABLET, EXTENDED RELEASE ORAL at 12:32

## 2018-05-12 RX ADMIN — Medication SCH PKT: at 09:50

## 2018-05-12 RX ADMIN — Medication SCH ML: at 09:47

## 2018-05-12 RX ADMIN — LISINOPRIL SCH MG: 20 TABLET ORAL at 09:47

## 2018-05-12 RX ADMIN — MONTELUKAST SODIUM SCH MG: 10 TABLET, FILM COATED ORAL at 09:54

## 2018-05-12 RX ADMIN — GABAPENTIN PRN MG: 300 CAPSULE ORAL at 13:39

## 2018-05-12 RX ADMIN — HYDROCODONE BITARTRATE AND ACETAMINOPHEN PRN TAB: 10; 325 TABLET ORAL at 23:51

## 2018-05-12 RX ADMIN — HUMAN INSULIN SCH: 100 INJECTION, SOLUTION SUBCUTANEOUS at 11:30

## 2018-05-12 RX ADMIN — GABAPENTIN PRN MG: 300 CAPSULE ORAL at 23:50

## 2018-05-12 RX ADMIN — Medication SCH PKT: at 22:10

## 2018-05-12 NOTE — P.PN
Subjective


Date of Service: 05/12/18


Primary Care Provider: None


Chief Complaint: Cellulitis 





Patient seen and examined at bedside with RN.  Case discussed with family. 

Currently patient is doing well.  States that she feels much better than 

before. Urine culture + for gram - rods. 





Review of Systems


General: As per HPI





Physical Examination





- Vital Signs


Temperature: 98.4 F


Blood Pressure: 124/56


Pulse: 88


Respirations: 16


Pulse Ox (%): 97





- Physical Exam


General: Alert, In no apparent distress, Obese


HEENT: Atraumatic, PERRLA, EOMI


Neck: Supple, JVD not distended


Respiratory: Clear to auscultation bilaterally, Normal air movement


Cardiovascular: Regular rate/rhythm, Normal S1 S2


Gastrointestinal: Normal bowel sounds, No tenderness


Musculoskeletal: No tenderness, Swelling, Erythema, Warmth


Integumentary: No rashes


Neurological: Normal speech, Normal tone, Normal affect


Lymphatics: No axilla or inguinal lymphadenopathy





- Studies


Medications List Reviewed: Yes





Assessment & Plan





- Problems (Diagnosis)


(1) Cellulitis


Onset Date: 05/10/18   Current Visit: Yes   Status: Acute   


Plan: 


BL LE cellulitis most likely 2/2 to chronic swelling. Improving slowly 


-IV zosyn for now


-MRI negaitve for osteomylitis 


-Ace wrap and elevate legs. 


-Blood cutlure pending


Qualifiers: 


   Site of cellulitis: extremity   Site of cellulitis of extremity: lower 

extremity   Laterality: unspecified laterality   Qualified Code(s): L03.119 - 

Cellulitis of unspecified part of limb   





(2) UTI (urinary tract infection)


Current Visit: No   Status: Acute   


Plan: 


UA + for UTI


-Urine culture + for gram - rods


-IV zosyn for now 





Qualifiers: 


   Urinary tract infection type: acute cystitis   Hematuria presence: without 

hematuria   Qualified Code(s): N30.00 - Acute cystitis without hematuria   





(3) Lymphedema


Onset Date: 05/10/18   Current Visit: Yes   Status: Chronic   





(4) Atrial fibrillation


Onset Date: 03/19/18   Current Visit: No   Status: Chronic   


Qualifiers: 


   Atrial fibrillation type: chronic   Qualified Code(s): I48.2 - Chronic 

atrial fibrillation   





(5) COPD (chronic obstructive pulmonary disease)


Onset Date: 03/19/18   Current Visit: No   Status: Chronic   


Qualifiers: 


   COPD type: chronic bronchitis   Chronic bronchitis type: mixed simple and 

mucopurulent   Qualified Code(s): J41.8 - Mixed simple and mucopurulent chronic 

bronchitis   





(6) History of CVA (cerebrovascular accident)


Onset Date: 03/19/18   Current Visit: No   Status: Chronic   





(7) Hypertension


Onset Date: 03/19/18   Current Visit: No   Status: Chronic   


Qualifiers: 


   Hypertension type: essential hypertension 





(8) Hyperthyroidism


Onset Date: 03/19/18   Current Visit: No   Status: Chronic   





(9) Obesity


Onset Date: 03/19/18   Current Visit: No   Status: Chronic   


Qualifiers: 


   Obesity type: due to excess calories   Obesity classification: adult class 3 

(BMI >= 40)   Serious obesity comorbidity presence: with serious comorbidity   

Body mass index: BMI 45.0-49.9   Qualified Code(s): E66.01 - Morbid (severe) 

obesity due to excess calories; Z68.42 - Body mass index (BMI) 45.0-49.9, adult

   


Discharge Plan: Home


Plan to discharge in: 24 Hours





- Code Status/Comfort Care


Code Status Assessed: Yes


Critical Care: No

## 2018-05-13 LAB
BUN BLD-MCNC: 26 MG/DL (ref 6–20)
GLUCOSE SERPLBLD-MCNC: 147 MG/DL (ref 65–120)
MAGNESIUM SERPL-MCNC: 1.7 MG/DL (ref 1.8–2.5)
POTASSIUM SERPL-SCNC: 3.5 MEQ/L (ref 3.6–5)

## 2018-05-13 RX ADMIN — MONTELUKAST SODIUM SCH MG: 10 TABLET, FILM COATED ORAL at 11:07

## 2018-05-13 RX ADMIN — LISINOPRIL SCH MG: 20 TABLET ORAL at 11:03

## 2018-05-13 RX ADMIN — HUMAN INSULIN SCH: 100 INJECTION, SOLUTION SUBCUTANEOUS at 11:30

## 2018-05-13 RX ADMIN — PANTOPRAZOLE SODIUM SCH MG: 40 TABLET, DELAYED RELEASE ORAL at 05:44

## 2018-05-13 RX ADMIN — ISOSORBIDE MONONITRATE SCH: 60 TABLET, EXTENDED RELEASE ORAL at 09:00

## 2018-05-13 RX ADMIN — CARVEDILOL SCH MG: 25 TABLET, FILM COATED ORAL at 20:32

## 2018-05-13 RX ADMIN — HUMAN INSULIN SCH: 100 INJECTION, SOLUTION SUBCUTANEOUS at 16:30

## 2018-05-13 RX ADMIN — GABAPENTIN PRN MG: 300 CAPSULE ORAL at 20:33

## 2018-05-13 RX ADMIN — SODIUM CHLORIDE SCH MLS: 9 INJECTION, SOLUTION INTRAVENOUS at 17:05

## 2018-05-13 RX ADMIN — FUROSEMIDE SCH MG: 40 TABLET ORAL at 17:05

## 2018-05-13 RX ADMIN — Medication SCH PKT: at 21:12

## 2018-05-13 RX ADMIN — FUROSEMIDE SCH MG: 40 TABLET ORAL at 11:03

## 2018-05-13 RX ADMIN — SODIUM CHLORIDE SCH MLS: 9 INJECTION, SOLUTION INTRAVENOUS at 01:45

## 2018-05-13 RX ADMIN — CYANOCOBALAMIN TAB 1000 MCG SCH MCG: 1000 TAB at 11:03

## 2018-05-13 RX ADMIN — CARVEDILOL SCH MG: 25 TABLET, FILM COATED ORAL at 11:04

## 2018-05-13 RX ADMIN — SODIUM CHLORIDE SCH MLS: 9 INJECTION, SOLUTION INTRAVENOUS at 11:04

## 2018-05-13 RX ADMIN — RIVAROXABAN SCH MG: 20 TABLET, FILM COATED ORAL at 17:05

## 2018-05-13 RX ADMIN — Medication SCH: at 20:32

## 2018-05-13 RX ADMIN — Medication SCH ML: at 20:32

## 2018-05-13 RX ADMIN — Medication SCH ML: at 11:05

## 2018-05-13 RX ADMIN — HUMAN INSULIN SCH: 100 INJECTION, SOLUTION SUBCUTANEOUS at 20:31

## 2018-05-13 RX ADMIN — Medication SCH: at 09:00

## 2018-05-13 RX ADMIN — HYDROCODONE BITARTRATE AND ACETAMINOPHEN PRN TAB: 10; 325 TABLET ORAL at 20:33

## 2018-05-13 RX ADMIN — HUMAN INSULIN SCH: 100 INJECTION, SOLUTION SUBCUTANEOUS at 07:30

## 2018-05-13 NOTE — P.PN
Subjective


Date of Service: 05/13/18


Primary Care Provider: None


Chief Complaint: Cellulitis 





Patient seen and examined at bedside with RN.  Case discussed with family. 

Currently patient is doing well.  States that she feels much better than 

before. Urine culture + for ESBL 





Review of Systems


General: As per HPI





Physical Examination





- Vital Signs


Temperature: 99.2 F


Blood Pressure: 132/70


Pulse: 68


Respirations: 18


Pulse Ox (%): 91





- Physical Exam


General: Alert, In no apparent distress


HEENT: Atraumatic, PERRLA, EOMI


Neck: Supple, JVD not distended


Respiratory: Clear to auscultation bilaterally, Normal air movement


Cardiovascular: Regular rate/rhythm, Normal S1 S2


Gastrointestinal: Normal bowel sounds, No tenderness


Musculoskeletal: No tenderness, Erythema, Warmth


Integumentary: No rashes


Neurological: Normal speech, Normal tone, Normal affect


Lymphatics: No axilla or inguinal lymphadenopathy





- Studies


Microbiology Data (last 24 hrs): 








05/10/18 15:25   Catheterized Urine   [place] Count - Final


                            BETWEEN 10,000 & 100,000 CFU/ML


05/10/18 15:25   Catheterized Urine    - Final


                            Escherichia Coli





Medications List Reviewed: Yes





Assessment & Plan





- Problems (Diagnosis)


(1) Cellulitis


Onset Date: 05/10/18   Current Visit: Yes   Status: Acute   


Plan: 


BL LE cellulitis most likely 2/2 to chronic swelling. Improving slowly 


-IV Meropenum for now


-MRI negaitve for osteomylitis 


-Ace wrap and elevate legs. 


-Blood culture negative thus far


Qualifiers: 


   Site of cellulitis: extremity   Site of cellulitis of extremity: lower 

extremity   Laterality: unspecified laterality   Qualified Code(s): L03.119 - 

Cellulitis of unspecified part of limb   





(2) UTI (urinary tract infection)


Current Visit: No   Status: Acute   


Plan: 


UA + for UTI


-Urine culture + for ESBL


-IV Merrem for now 


-PICC line placed 


-Pt will need placement 








Qualifiers: 


   Urinary tract infection type: acute cystitis   Hematuria presence: without 

hematuria   Qualified Code(s): N30.00 - Acute cystitis without hematuria   





(3) Lymphedema


Onset Date: 05/10/18   Current Visit: Yes   Status: Chronic   





(4) Atrial fibrillation


Onset Date: 03/19/18   Current Visit: No   Status: Chronic   


Qualifiers: 


   Atrial fibrillation type: chronic   Qualified Code(s): I48.2 - Chronic 

atrial fibrillation   





(5) COPD (chronic obstructive pulmonary disease)


Onset Date: 03/19/18   Current Visit: No   Status: Chronic   


Qualifiers: 


   COPD type: chronic bronchitis   Chronic bronchitis type: mixed simple and 

mucopurulent   Qualified Code(s): J41.8 - Mixed simple and mucopurulent chronic 

bronchitis   





(6) History of CVA (cerebrovascular accident)


Onset Date: 03/19/18   Current Visit: No   Status: Chronic   





(7) Hypertension


Onset Date: 03/19/18   Current Visit: No   Status: Chronic   


Qualifiers: 


   Hypertension type: essential hypertension 





(8) Hyperthyroidism


Onset Date: 03/19/18   Current Visit: No   Status: Chronic   





(9) Obesity


Onset Date: 03/19/18   Current Visit: No   Status: Chronic   


Qualifiers: 


   Obesity type: due to excess calories   Obesity classification: adult class 3 

(BMI >= 40)   Serious obesity comorbidity presence: with serious comorbidity   

Body mass index: BMI 45.0-49.9   Qualified Code(s): E66.01 - Morbid (severe) 

obesity due to excess calories; Z68.42 - Body mass index (BMI) 45.0-49.9, adult

## 2018-05-13 NOTE — RAD REPORT
EXAM DESCRIPTION:  RAD - Chest Single View - 5/13/2018 3:35 am

 

CLINICAL HISTORY:  PICC line placement.

 

COMPARISON:  None.

 

FINDINGS:  Portable chest was obtained following placement of a right upper extremity PICC line. The 
catheter tip is in the SVC.

## 2018-05-14 LAB
BUN BLD-MCNC: 26 MG/DL (ref 6–20)
GLUCOSE SERPLBLD-MCNC: 113 MG/DL (ref 65–120)
MAGNESIUM SERPL-MCNC: 1.9 MG/DL (ref 1.8–2.5)
POTASSIUM SERPL-SCNC: 3.1 MEQ/L (ref 3.6–5)

## 2018-05-14 RX ADMIN — PANTOPRAZOLE SODIUM SCH MG: 40 TABLET, DELAYED RELEASE ORAL at 05:30

## 2018-05-14 RX ADMIN — HUMAN INSULIN SCH: 100 INJECTION, SOLUTION SUBCUTANEOUS at 07:30

## 2018-05-14 RX ADMIN — HYDROCODONE BITARTRATE AND ACETAMINOPHEN PRN TAB: 10; 325 TABLET ORAL at 05:26

## 2018-05-14 RX ADMIN — HYDROCODONE BITARTRATE AND ACETAMINOPHEN PRN TAB: 10; 325 TABLET ORAL at 00:24

## 2018-05-14 RX ADMIN — Medication SCH APPL: at 08:28

## 2018-05-14 RX ADMIN — CARVEDILOL SCH: 25 TABLET, FILM COATED ORAL at 09:00

## 2018-05-14 RX ADMIN — ISOSORBIDE MONONITRATE SCH: 60 TABLET, EXTENDED RELEASE ORAL at 09:00

## 2018-05-14 RX ADMIN — Medication SCH PKT: at 09:00

## 2018-05-14 RX ADMIN — SODIUM CHLORIDE SCH MLS: 9 INJECTION, SOLUTION INTRAVENOUS at 00:24

## 2018-05-14 RX ADMIN — FUROSEMIDE SCH MG: 40 TABLET ORAL at 17:35

## 2018-05-14 RX ADMIN — SODIUM CHLORIDE SCH MLS: 9 INJECTION, SOLUTION INTRAVENOUS at 09:00

## 2018-05-14 RX ADMIN — HUMAN INSULIN SCH: 100 INJECTION, SOLUTION SUBCUTANEOUS at 21:00

## 2018-05-14 RX ADMIN — MONTELUKAST SODIUM SCH MG: 10 TABLET, FILM COATED ORAL at 08:28

## 2018-05-14 RX ADMIN — SODIUM CHLORIDE SCH MLS: 9 INJECTION, SOLUTION INTRAVENOUS at 17:34

## 2018-05-14 RX ADMIN — FUROSEMIDE SCH: 40 TABLET ORAL at 09:00

## 2018-05-14 RX ADMIN — Medication SCH ML: at 21:57

## 2018-05-14 RX ADMIN — CYANOCOBALAMIN TAB 1000 MCG SCH MCG: 1000 TAB at 08:28

## 2018-05-14 RX ADMIN — CARVEDILOL SCH MG: 25 TABLET, FILM COATED ORAL at 21:57

## 2018-05-14 RX ADMIN — HUMAN INSULIN SCH: 100 INJECTION, SOLUTION SUBCUTANEOUS at 11:30

## 2018-05-14 RX ADMIN — HUMAN INSULIN SCH: 100 INJECTION, SOLUTION SUBCUTANEOUS at 16:30

## 2018-05-14 RX ADMIN — GABAPENTIN PRN MG: 300 CAPSULE ORAL at 05:26

## 2018-05-14 RX ADMIN — Medication SCH PKT: at 21:56

## 2018-05-14 RX ADMIN — Medication SCH ML: at 08:28

## 2018-05-14 RX ADMIN — LISINOPRIL SCH: 20 TABLET ORAL at 09:00

## 2018-05-14 RX ADMIN — RIVAROXABAN SCH MG: 20 TABLET, FILM COATED ORAL at 17:35

## 2018-05-14 NOTE — P.PN
Subjective


Date of Service: 05/14/18


Primary Care Provider: None


Chief Complaint: Cellulitis 





Patient seen and examined at bedside with RN.  Case discussed with family. 

Currently patient is doing well.  States that she feels much better than 

before. Urine culture + for ESBL 





Review of Systems


General: As per HPI





Physical Examination





- Vital Signs


Temperature: 98.2 F


Blood Pressure: 100/34


Pulse: 77


Respirations: 18


Pulse Ox (%): 90





- Physical Exam


General: Alert, In no apparent distress


HEENT: Atraumatic, PERRLA, EOMI


Neck: Supple, JVD not distended


Respiratory: Clear to auscultation bilaterally, Normal air movement


Cardiovascular: Regular rate/rhythm, Normal S1 S2


Gastrointestinal: Normal bowel sounds, Soft and benign, Non-distended, No 

tenderness


Musculoskeletal: Erythema, Tenderness, Warmth


Integumentary: No rashes


Neurological: Normal speech, Normal tone, Normal affect


Lymphatics: No axilla or inguinal lymphadenopathy





- Studies


Medications List Reviewed: Yes





Assessment & Plan





- Problems (Diagnosis)


(1) Cellulitis


Onset Date: 05/10/18   Current Visit: Yes   Status: Acute   


Plan: 


BL LE cellulitis most likely 2/2 to chronic swelling. Improving slowly 


-IV Meropenum for now


-MRI negaitve for osteomylitis 


-Ace wrap and elevate legs. 


-Blood culture negative thus far


Qualifiers: 


   Site of cellulitis: extremity   Site of cellulitis of extremity: lower 

extremity   Laterality: unspecified laterality   Qualified Code(s): L03.119 - 

Cellulitis of unspecified part of limb   





(2) UTI (urinary tract infection)


Current Visit: No   Status: Acute   


Plan: 


UA + for UTI


-Urine culture + for ESBL


-IV Merrem for now 


-PICC line placed 


-Pt will need placement 








Qualifiers: 


   Urinary tract infection type: acute cystitis   Hematuria presence: without 

hematuria   Qualified Code(s): N30.00 - Acute cystitis without hematuria   





(3) Lymphedema


Onset Date: 05/10/18   Current Visit: Yes   Status: Chronic   





(4) Atrial fibrillation


Onset Date: 03/19/18   Current Visit: No   Status: Chronic   


Qualifiers: 


   Atrial fibrillation type: chronic   Qualified Code(s): I48.2 - Chronic 

atrial fibrillation   





(5) COPD (chronic obstructive pulmonary disease)


Onset Date: 03/19/18   Current Visit: No   Status: Chronic   


Qualifiers: 


   COPD type: chronic bronchitis   Chronic bronchitis type: mixed simple and 

mucopurulent   Qualified Code(s): J41.8 - Mixed simple and mucopurulent chronic 

bronchitis   





(6) History of CVA (cerebrovascular accident)


Onset Date: 03/19/18   Current Visit: No   Status: Chronic   





(7) Hypertension


Onset Date: 03/19/18   Current Visit: No   Status: Chronic   


Qualifiers: 


   Hypertension type: essential hypertension 





(8) Hyperthyroidism


Onset Date: 03/19/18   Current Visit: No   Status: Chronic   





(9) Obesity


Onset Date: 03/19/18   Current Visit: No   Status: Chronic   


Qualifiers: 


   Obesity type: due to excess calories   Obesity classification: adult class 3 

(BMI >= 40)   Serious obesity comorbidity presence: with serious comorbidity   

Body mass index: BMI 45.0-49.9   Qualified Code(s): E66.01 - Morbid (severe) 

obesity due to excess calories; Z68.42 - Body mass index (BMI) 45.0-49.9, adult

   





(10) Metabolic alkalosis


Current Visit: Yes   Status: Acute   


Plan: 


Most likely 2.2 to HCTZ. Will hold for now 


-Will hold lasix as well for now due to Hypotension

## 2018-05-14 NOTE — P.PN
Date of Service: 05/14/18





Patient has developed a slightly worsening metabolic alkalosis.  This is most 

likely related to contraction metabolic alkalosis from diuretics.  Will 

reassess in the morning and we will hold hydrochlorothiazide.  Reassess in the 

next 24-48 hrs.

## 2018-05-15 LAB
ALBUMIN SERPL BCP-MCNC: 2.6 G/DL (ref 3.2–5.5)
ALP SERPL-CCNC: 27 IU/L (ref 42–121)
ALT SERPL W P-5'-P-CCNC: 15 IU/L (ref 10–60)
AST SERPL W P-5'-P-CCNC: 24 IU/L (ref 10–42)
BUN BLD-MCNC: 30 MG/DL (ref 6–20)
BUN BLD-MCNC: 31 MG/DL (ref 6–20)
COHGB MFR BLDA: 1.8 % (ref 0–1.5)
GLUCOSE SERPLBLD-MCNC: 130 MG/DL (ref 65–120)
GLUCOSE SERPLBLD-MCNC: 139 MG/DL (ref 65–120)
OXYHGB MFR BLDA: 90.2 % (ref 94–97)
POTASSIUM SERPL-SCNC: 3.3 MEQ/L (ref 3.6–5)
POTASSIUM SERPL-SCNC: 3.7 MEQ/L (ref 3.6–5)
SAO2 % BLDA: 92 % (ref 92–98.5)

## 2018-05-15 RX ADMIN — CYANOCOBALAMIN TAB 1000 MCG SCH MCG: 1000 TAB at 09:58

## 2018-05-15 RX ADMIN — HUMAN INSULIN SCH: 100 INJECTION, SOLUTION SUBCUTANEOUS at 07:30

## 2018-05-15 RX ADMIN — CARVEDILOL SCH MG: 25 TABLET, FILM COATED ORAL at 09:56

## 2018-05-15 RX ADMIN — FUROSEMIDE SCH MG: 40 TABLET ORAL at 09:57

## 2018-05-15 RX ADMIN — SODIUM CHLORIDE SCH MLS: 9 INJECTION, SOLUTION INTRAVENOUS at 00:49

## 2018-05-15 RX ADMIN — Medication SCH PKT: at 09:00

## 2018-05-15 RX ADMIN — SODIUM CHLORIDE SCH MLS: 9 INJECTION, SOLUTION INTRAVENOUS at 17:01

## 2018-05-15 RX ADMIN — Medication SCH ML: at 09:58

## 2018-05-15 RX ADMIN — HUMAN INSULIN SCH: 100 INJECTION, SOLUTION SUBCUTANEOUS at 11:30

## 2018-05-15 RX ADMIN — MONTELUKAST SODIUM SCH MG: 10 TABLET, FILM COATED ORAL at 09:56

## 2018-05-15 RX ADMIN — Medication SCH APPL: at 09:00

## 2018-05-15 RX ADMIN — ISOSORBIDE MONONITRATE SCH MG: 60 TABLET, EXTENDED RELEASE ORAL at 09:57

## 2018-05-15 RX ADMIN — PANTOPRAZOLE SODIUM SCH MG: 40 TABLET, DELAYED RELEASE ORAL at 05:31

## 2018-05-15 RX ADMIN — Medication SCH PKT: at 21:12

## 2018-05-15 RX ADMIN — HUMAN INSULIN SCH: 100 INJECTION, SOLUTION SUBCUTANEOUS at 21:00

## 2018-05-15 RX ADMIN — RIVAROXABAN SCH MG: 20 TABLET, FILM COATED ORAL at 17:01

## 2018-05-15 RX ADMIN — SODIUM CHLORIDE SCH MLS: 9 INJECTION, SOLUTION INTRAVENOUS at 09:56

## 2018-05-15 RX ADMIN — CARVEDILOL SCH MG: 25 TABLET, FILM COATED ORAL at 21:13

## 2018-05-15 RX ADMIN — Medication SCH ML: at 21:13

## 2018-05-15 RX ADMIN — LISINOPRIL SCH MG: 20 TABLET ORAL at 09:56

## 2018-05-15 RX ADMIN — HUMAN INSULIN SCH: 100 INJECTION, SOLUTION SUBCUTANEOUS at 16:30

## 2018-05-15 NOTE — P.PN
Subjective


Date of Service: 05/15/18


Primary Care Provider: None


Chief Complaint: Cellulitis 





Patient seen and examined at bedside with RN.  Case discussed with family. 

Currently patient is doing well.  States that she feels much better than 

before. Urine culture + for ESBL. Overnight pt was AAOx 2 but in AM during 

rounds pt was More Alert and oriented 








Review of Systems


General: As per HPI





Physical Examination





- Vital Signs


Temperature: 97.9 F


Blood Pressure: 126/52


Pulse: 74


Respirations: 16


Pulse Ox (%): 91





- Physical Exam


General: Alert, In no apparent distress, Oriented x3


HEENT: Atraumatic, PERRLA, EOMI


Neck: Supple, JVD not distended


Respiratory: Clear to auscultation bilaterally, Normal air movement


Cardiovascular: Regular rate/rhythm, Normal S1 S2


Gastrointestinal: Normal bowel sounds, No tenderness


Musculoskeletal: Swelling


Integumentary: No rashes


Neurological: Normal speech, Normal tone, Normal affect


Lymphatics: No axilla or inguinal lymphadenopathy





- Studies


Microbiology Data (last 24 hrs): 








05/09/18 18:30   Blood  - Blood   Aerobic Blood Culture - Final


                            No growth in 5 days.


05/09/18 18:30   Blood  - Blood   Anaerobic Blood Culture - Final


                            No growth in 5 days.


05/09/18 18:15   Blood  - Blood   Aerobic Blood Culture - Final


                            No growth in 5 days.


05/09/18 18:15   Blood  - Blood   Anaerobic Blood Culture - Final


                            No growth in 5 days.





Medications List Reviewed: Yes





Assessment & Plan





- Problems (Diagnosis)


(1) Cellulitis


Onset Date: 05/10/18   Current Visit: Yes   Status: Acute   


Plan: 


BL LE cellulitis most likely 2/2 to chronic swelling. Improving slowly 


-IV Meropenum for now


-MRI negaitve for osteomylitis 


-Ace wrap and elevate legs. 


-Blood culture negative thus far


Qualifiers: 


   Site of cellulitis: extremity   Site of cellulitis of extremity: lower 

extremity   Laterality: unspecified laterality   Qualified Code(s): L03.119 - 

Cellulitis of unspecified part of limb   





(2) UTI (urinary tract infection)


Current Visit: No   Status: Acute   


Plan: 


UA + for UTI


-Urine culture + for ESBL


-IV Merrem for now 


-PICC line placed 


-Pt will need placement 








Qualifiers: 


   Urinary tract infection type: acute cystitis   Hematuria presence: without 

hematuria   Qualified Code(s): N30.00 - Acute cystitis without hematuria   





(3) Lymphedema


Onset Date: 05/10/18   Current Visit: Yes   Status: Chronic   





(4) Atrial fibrillation


Onset Date: 03/19/18   Current Visit: No   Status: Chronic   


Qualifiers: 


   Atrial fibrillation type: chronic   Qualified Code(s): I48.2 - Chronic 

atrial fibrillation   





(5) COPD (chronic obstructive pulmonary disease)


Onset Date: 03/19/18   Current Visit: No   Status: Chronic   


Qualifiers: 


   COPD type: chronic bronchitis   Chronic bronchitis type: mixed simple and 

mucopurulent   Qualified Code(s): J41.8 - Mixed simple and mucopurulent chronic 

bronchitis   





(6) History of CVA (cerebrovascular accident)


Onset Date: 03/19/18   Current Visit: No   Status: Chronic   





(7) Hypertension


Onset Date: 03/19/18   Current Visit: No   Status: Chronic   


Qualifiers: 


   Hypertension type: essential hypertension 





(8) Hyperthyroidism


Onset Date: 03/19/18   Current Visit: No   Status: Chronic   





(9) Obesity


Onset Date: 03/19/18   Current Visit: No   Status: Chronic   


Qualifiers: 


   Obesity type: due to excess calories   Obesity classification: adult class 3 

(BMI >= 40)   Serious obesity comorbidity presence: with serious comorbidity   

Body mass index: BMI 45.0-49.9   Qualified Code(s): E66.01 - Morbid (severe) 

obesity due to excess calories; Z68.42 - Body mass index (BMI) 45.0-49.9, adult

   





(10) Metabolic alkalosis


Current Visit: Yes   Status: Acute   


Plan: 


Most likely 2.2 to HCTZ and lasix. Will hold for now 


-Will hold lasix as well for now due to Hypotension








Discharge Plan: Nursing Home


Plan to discharge in: 48 Hours





- Code Status/Comfort Care


Code Status Assessed: Yes


Critical Care: No

## 2018-05-16 LAB
ALBUMIN SERPL BCP-MCNC: 2.4 G/DL (ref 3.2–5.5)
ALP SERPL-CCNC: 27 IU/L (ref 42–121)
ALT SERPL W P-5'-P-CCNC: 13 IU/L (ref 10–60)
AST SERPL W P-5'-P-CCNC: 21 IU/L (ref 10–42)
BUN BLD-MCNC: 37 MG/DL (ref 6–20)
GLUCOSE SERPLBLD-MCNC: 124 MG/DL (ref 65–120)
HCT VFR BLD CALC: 30.3 % (ref 36–45)
LYMPHOCYTES # SPEC AUTO: 1.7 K/UL (ref 0.7–4.9)
MCH RBC QN AUTO: 26.8 PG (ref 27–35)
MCV RBC: 84.9 FL (ref 80–100)
PMV BLD: 8.8 FL (ref 7.6–11.3)
POTASSIUM SERPL-SCNC: 3.7 MEQ/L (ref 3.6–5)
RBC # BLD: 3.57 M/UL (ref 3.86–4.86)

## 2018-05-16 RX ADMIN — SODIUM CHLORIDE SCH MLS: 9 INJECTION, SOLUTION INTRAVENOUS at 10:11

## 2018-05-16 RX ADMIN — HUMAN INSULIN SCH: 100 INJECTION, SOLUTION SUBCUTANEOUS at 21:00

## 2018-05-16 RX ADMIN — RIVAROXABAN SCH MG: 20 TABLET, FILM COATED ORAL at 17:13

## 2018-05-16 RX ADMIN — SODIUM CHLORIDE SCH MLS: 9 INJECTION, SOLUTION INTRAVENOUS at 00:32

## 2018-05-16 RX ADMIN — ISOSORBIDE MONONITRATE SCH: 60 TABLET, EXTENDED RELEASE ORAL at 09:00

## 2018-05-16 RX ADMIN — CARVEDILOL SCH MG: 25 TABLET, FILM COATED ORAL at 21:36

## 2018-05-16 RX ADMIN — ACETAMINOPHEN PRN MG: 500 TABLET, FILM COATED ORAL at 18:00

## 2018-05-16 RX ADMIN — Medication SCH PKT: at 10:12

## 2018-05-16 RX ADMIN — MONTELUKAST SODIUM SCH MG: 10 TABLET, FILM COATED ORAL at 10:13

## 2018-05-16 RX ADMIN — LISINOPRIL SCH MG: 20 TABLET ORAL at 10:13

## 2018-05-16 RX ADMIN — Medication SCH APPL: at 09:00

## 2018-05-16 RX ADMIN — CARVEDILOL SCH: 25 TABLET, FILM COATED ORAL at 09:00

## 2018-05-16 RX ADMIN — SODIUM CHLORIDE SCH MLS: 9 INJECTION, SOLUTION INTRAVENOUS at 17:13

## 2018-05-16 RX ADMIN — CYANOCOBALAMIN TAB 1000 MCG SCH MCG: 1000 TAB at 10:13

## 2018-05-16 RX ADMIN — HUMAN INSULIN SCH: 100 INJECTION, SOLUTION SUBCUTANEOUS at 07:30

## 2018-05-16 RX ADMIN — Medication SCH PKT: at 21:00

## 2018-05-16 RX ADMIN — PANTOPRAZOLE SODIUM SCH MG: 40 TABLET, DELAYED RELEASE ORAL at 17:14

## 2018-05-16 RX ADMIN — PANTOPRAZOLE SODIUM SCH MG: 40 TABLET, DELAYED RELEASE ORAL at 06:30

## 2018-05-16 RX ADMIN — HUMAN INSULIN SCH: 100 INJECTION, SOLUTION SUBCUTANEOUS at 16:30

## 2018-05-16 RX ADMIN — HUMAN INSULIN SCH: 100 INJECTION, SOLUTION SUBCUTANEOUS at 11:30

## 2018-05-16 RX ADMIN — Medication SCH ML: at 10:14

## 2018-05-16 NOTE — P.PN
Subjective


Date of Service: 05/16/18


Primary Care Provider: None


Chief Complaint: Cellulitis 





Patient seen and examined at bedside with RN.  Case discussed with family. 

Currently patient is doing well.  States that she feels much better than 

before. Urine culture + for ESBL. Overnight pt was AAOx 3 but in AM during 

rounds pt was More Alert and oriented 








Review of Systems


General: As per HPI





Physical Examination





- Vital Signs


Temperature: 98.6 F


Blood Pressure: 98/42


Pulse: 78


Respirations: 18


Pulse Ox (%): 93





- Physical Exam


General: Alert, In no apparent distress


HEENT: Atraumatic, PERRLA, EOMI


Neck: Supple, JVD not distended


Respiratory: Clear to auscultation bilaterally, Normal air movement


Cardiovascular: Regular rate/rhythm, Normal S1 S2


Gastrointestinal: Normal bowel sounds, No tenderness


Musculoskeletal: Erythema, Tenderness, Warmth (More Warm then before today)


Integumentary: No rashes


Neurological: Normal speech, Normal tone, Normal affect


Lymphatics: No axilla or inguinal lymphadenopathy





- Studies


Medications List Reviewed: Yes





Assessment & Plan





- Problems (Diagnosis)


(1) Metabolic alkalosis


Current Visit: Yes   Status: Acute   


Plan: 


elevated CO2. 


-Most likely 2.2 to HCTZ and lasix. Will hold for now 


-Will recheck yaritza AM 








(2) Cellulitis


Onset Date: 05/10/18   Current Visit: Yes   Status: Acute   


Plan: 


BL LE cellulitis most likely 2/2 to chronic swelling. Improving slowly 


-IV Meropenum for now


-MRI negaitve for osteomylitis 


-Ace wrap and elevate legs. 


-Blood culture negative thus far


Qualifiers: 


   Site of cellulitis: extremity   Site of cellulitis of extremity: lower 

extremity   Laterality: unspecified laterality   Qualified Code(s): L03.119 - 

Cellulitis of unspecified part of limb   





(3) UTI (urinary tract infection)


Current Visit: No   Status: Acute   


Plan: 


UA + for UTI


-Urine culture + for ESBL


-IV Merrem day 5/14.  


-PICC line placed 








Qualifiers: 


   Urinary tract infection type: acute cystitis   Hematuria presence: without 

hematuria   Qualified Code(s): N30.00 - Acute cystitis without hematuria   





(4) Lymphedema


Onset Date: 05/10/18   Current Visit: Yes   Status: Chronic   





(5) Atrial fibrillation


Onset Date: 03/19/18   Current Visit: No   Status: Chronic   


Qualifiers: 


   Atrial fibrillation type: chronic   Qualified Code(s): I48.2 - Chronic 

atrial fibrillation   





(6) COPD (chronic obstructive pulmonary disease)


Onset Date: 03/19/18   Current Visit: No   Status: Chronic   


Qualifiers: 


   COPD type: chronic bronchitis   Chronic bronchitis type: mixed simple and 

mucopurulent   Qualified Code(s): J41.8 - Mixed simple and mucopurulent chronic 

bronchitis   





(7) History of CVA (cerebrovascular accident)


Onset Date: 03/19/18   Current Visit: No   Status: Chronic   





(8) Hypertension


Onset Date: 03/19/18   Current Visit: No   Status: Chronic   


Qualifiers: 


   Hypertension type: essential hypertension 





(9) Hyperthyroidism


Onset Date: 03/19/18   Current Visit: No   Status: Chronic   





(10) Obesity


Onset Date: 03/19/18   Current Visit: No   Status: Chronic   


Qualifiers: 


   Obesity type: due to excess calories   Obesity classification: adult class 3 

(BMI >= 40)   Serious obesity comorbidity presence: with serious comorbidity   

Body mass index: BMI 45.0-49.9   Qualified Code(s): E66.01 - Morbid (severe) 

obesity due to excess calories; Z68.42 - Body mass index (BMI) 45.0-49.9, adult

## 2018-05-17 LAB
BUN BLD-MCNC: 32 MG/DL (ref 6–20)
GLUCOSE SERPLBLD-MCNC: 97 MG/DL (ref 65–120)
POTASSIUM SERPL-SCNC: 3.2 MEQ/L (ref 3.6–5)

## 2018-05-17 RX ADMIN — ISOSORBIDE MONONITRATE SCH: 60 TABLET, EXTENDED RELEASE ORAL at 09:00

## 2018-05-17 RX ADMIN — POTASSIUM CHLORIDE SCH MLS: 200 INJECTION, SOLUTION INTRAVENOUS at 09:38

## 2018-05-17 RX ADMIN — CARVEDILOL SCH: 25 TABLET, FILM COATED ORAL at 09:00

## 2018-05-17 RX ADMIN — SODIUM CHLORIDE SCH MLS: 9 INJECTION, SOLUTION INTRAVENOUS at 17:23

## 2018-05-17 RX ADMIN — Medication SCH PKT: at 09:39

## 2018-05-17 RX ADMIN — Medication SCH APPL: at 09:00

## 2018-05-17 RX ADMIN — SODIUM CHLORIDE SCH MLS: 9 INJECTION, SOLUTION INTRAVENOUS at 09:38

## 2018-05-17 RX ADMIN — POTASSIUM CHLORIDE SCH MLS: 200 INJECTION, SOLUTION INTRAVENOUS at 06:35

## 2018-05-17 RX ADMIN — CYANOCOBALAMIN TAB 1000 MCG SCH MCG: 1000 TAB at 09:37

## 2018-05-17 RX ADMIN — PANTOPRAZOLE SODIUM SCH MG: 40 TABLET, DELAYED RELEASE ORAL at 08:31

## 2018-05-17 RX ADMIN — HUMAN INSULIN SCH: 100 INJECTION, SOLUTION SUBCUTANEOUS at 15:38

## 2018-05-17 RX ADMIN — PANTOPRAZOLE SODIUM SCH MG: 40 TABLET, DELAYED RELEASE ORAL at 17:24

## 2018-05-17 RX ADMIN — MONTELUKAST SODIUM SCH MG: 10 TABLET, FILM COATED ORAL at 09:38

## 2018-05-17 RX ADMIN — RIVAROXABAN SCH MG: 20 TABLET, FILM COATED ORAL at 17:24

## 2018-05-17 RX ADMIN — HUMAN INSULIN SCH: 100 INJECTION, SOLUTION SUBCUTANEOUS at 11:30

## 2018-05-17 RX ADMIN — HUMAN INSULIN SCH: 100 INJECTION, SOLUTION SUBCUTANEOUS at 07:30

## 2018-05-17 RX ADMIN — CARVEDILOL SCH MG: 25 TABLET, FILM COATED ORAL at 20:56

## 2018-05-17 RX ADMIN — HUMAN INSULIN SCH: 100 INJECTION, SOLUTION SUBCUTANEOUS at 21:00

## 2018-05-17 RX ADMIN — SODIUM CHLORIDE SCH MLS: 9 INJECTION, SOLUTION INTRAVENOUS at 01:34

## 2018-05-17 RX ADMIN — Medication SCH PKT: at 21:00

## 2018-05-17 RX ADMIN — LISINOPRIL SCH MG: 20 TABLET ORAL at 09:37

## 2018-05-17 NOTE — P.PN
Subjective


Date of Service: 05/17/18


Primary Care Provider: None


Chief Complaint: Cellulitis 





Patient seen and examined at bedside with RN.  Case discussed with family. 

Currently patient is doing well.  States that she feels much better than 

before. Urine culture + for ESBL. Overnight pt was AAOx 3 but in AM during 

rounds pt was More Alert and oriented. Pending Placement 








Review of Systems


General: As per HPI





Physical Examination





- Vital Signs


Temperature: 98.1 F


Blood Pressure: 119/61


Pulse: 80


Respirations: 26


Pulse Ox (%): 94





- Physical Exam


General: Alert, In no apparent distress, Oriented x3


HEENT: Atraumatic, PERRLA, EOMI


Neck: Supple, JVD not distended


Respiratory: Clear to auscultation bilaterally, Normal air movement


Cardiovascular: Regular rate/rhythm, Normal S1 S2


Gastrointestinal: Normal bowel sounds, No tenderness


Musculoskeletal: No tenderness


Integumentary: No rashes, Tenderness/swelling, Erythema, Warmth


Neurological: Normal speech, Normal tone, Normal affect


Lymphatics: No axilla or inguinal lymphadenopathy





- Studies


Medications List Reviewed: Yes





Assessment & Plan





- Problems (Diagnosis)


(1) Metabolic alkalosis


Current Visit: Yes   Status: Acute   


Plan: 


elevated CO2. 


-Most likely 2.2 to HCTZ and lasix. Will hold for now 


-Will recheck yaritza AM 








(2) Cellulitis


Onset Date: 05/10/18   Current Visit: Yes   Status: Acute   


Plan: 


BL LE cellulitis most likely 2/2 to chronic swelling. Improving slowly 


-IV Meropenum for now


-MRI negaitve for osteomylitis 


-Ace wrap and elevate legs. 


-Blood culture negative thus far


Qualifiers: 


   Site of cellulitis: extremity   Site of cellulitis of extremity: lower 

extremity   Laterality: unspecified laterality   Qualified Code(s): L03.119 - 

Cellulitis of unspecified part of limb   





(3) UTI (urinary tract infection)


Current Visit: No   Status: Acute   


Plan: 


UA + for UTI


-Urine culture + for ESBL


-IV Merrem day 6/10.  


-PICC line placed 








Qualifiers: 


   Urinary tract infection type: acute cystitis   Hematuria presence: without 

hematuria   Qualified Code(s): N30.00 - Acute cystitis without hematuria   





(4) Lymphedema


Onset Date: 05/10/18   Current Visit: Yes   Status: Chronic   





(5) Atrial fibrillation


Onset Date: 03/19/18   Current Visit: No   Status: Chronic   


Qualifiers: 


   Atrial fibrillation type: chronic   Qualified Code(s): I48.2 - Chronic 

atrial fibrillation   





(6) COPD (chronic obstructive pulmonary disease)


Onset Date: 03/19/18   Current Visit: No   Status: Chronic   


Qualifiers: 


   COPD type: chronic bronchitis   Chronic bronchitis type: mixed simple and 

mucopurulent   Qualified Code(s): J41.8 - Mixed simple and mucopurulent chronic 

bronchitis   





(7) History of CVA (cerebrovascular accident)


Onset Date: 03/19/18   Current Visit: No   Status: Chronic   





(8) Hypertension


Onset Date: 03/19/18   Current Visit: No   Status: Chronic   


Qualifiers: 


   Hypertension type: essential hypertension 





(9) Hyperthyroidism


Onset Date: 03/19/18   Current Visit: No   Status: Chronic   





(10) Obesity


Onset Date: 03/19/18   Current Visit: No   Status: Chronic   


Qualifiers: 


   Obesity type: due to excess calories   Obesity classification: adult class 3 

(BMI >= 40)   Serious obesity comorbidity presence: with serious comorbidity   

Body mass index: BMI 45.0-49.9   Qualified Code(s): E66.01 - Morbid (severe) 

obesity due to excess calories; Z68.42 - Body mass index (BMI) 45.0-49.9, adult

## 2018-05-18 VITALS — SYSTOLIC BLOOD PRESSURE: 100 MMHG | TEMPERATURE: 98 F | DIASTOLIC BLOOD PRESSURE: 58 MMHG

## 2018-05-18 VITALS — OXYGEN SATURATION: 95 %

## 2018-05-18 LAB
BUN BLD-MCNC: 32 MG/DL (ref 6–20)
GLUCOSE SERPLBLD-MCNC: 91 MG/DL (ref 65–120)
MAGNESIUM SERPL-MCNC: 1.7 MG/DL (ref 1.8–2.5)
POTASSIUM SERPL-SCNC: 3.6 MEQ/L (ref 3.6–5)

## 2018-05-18 RX ADMIN — HUMAN INSULIN SCH: 100 INJECTION, SOLUTION SUBCUTANEOUS at 11:30

## 2018-05-18 RX ADMIN — HUMAN INSULIN SCH: 100 INJECTION, SOLUTION SUBCUTANEOUS at 07:30

## 2018-05-18 RX ADMIN — CYANOCOBALAMIN TAB 1000 MCG SCH MCG: 1000 TAB at 08:09

## 2018-05-18 RX ADMIN — SODIUM CHLORIDE SCH MLS: 9 INJECTION, SOLUTION INTRAVENOUS at 01:04

## 2018-05-18 RX ADMIN — PANTOPRAZOLE SODIUM SCH MG: 40 TABLET, DELAYED RELEASE ORAL at 08:12

## 2018-05-18 RX ADMIN — Medication SCH PKT: at 08:08

## 2018-05-18 RX ADMIN — LISINOPRIL SCH MG: 20 TABLET ORAL at 08:09

## 2018-05-18 RX ADMIN — SODIUM CHLORIDE SCH MLS: 9 INJECTION, SOLUTION INTRAVENOUS at 08:08

## 2018-05-18 RX ADMIN — CARVEDILOL SCH MG: 25 TABLET, FILM COATED ORAL at 08:10

## 2018-05-18 RX ADMIN — MONTELUKAST SODIUM SCH MG: 10 TABLET, FILM COATED ORAL at 08:09

## 2018-05-18 RX ADMIN — Medication SCH: at 08:13

## 2018-05-18 RX ADMIN — GABAPENTIN PRN MG: 300 CAPSULE ORAL at 08:08

## 2018-05-18 RX ADMIN — ISOSORBIDE MONONITRATE SCH MG: 60 TABLET, EXTENDED RELEASE ORAL at 08:10

## 2018-05-18 NOTE — P.DS
Admission Date: 05/12/18


Discharge Date: 05/18/18


Primary Care Provider: None


Disposition: TRANSFER TO NURSING HOME


Discharge Condition: GOOD


Reason for Admission: Cellulitis 





- Problems


(1) Metabolic alkalosis


Status: Acute   





(2) Cellulitis


Onset Date: 05/10/18   Status: Acute   


Qualifiers: 


   Site of cellulitis: extremity   Site of cellulitis of extremity: lower 

extremity   Laterality: unspecified laterality   Qualified Code(s): L03.119 - 

Cellulitis of unspecified part of limb   





(3) UTI (urinary tract infection)


Status: Acute   


Qualifiers: 


   Urinary tract infection type: acute cystitis   Hematuria presence: without 

hematuria   Qualified Code(s): N30.00 - Acute cystitis without hematuria   





(4) Lymphedema


Onset Date: 05/10/18   Status: Chronic   





(5) Atrial fibrillation


Onset Date: 03/19/18   Status: Chronic   


Qualifiers: 


   Atrial fibrillation type: chronic   Qualified Code(s): I48.2 - Chronic 

atrial fibrillation   





(6) COPD (chronic obstructive pulmonary disease)


Onset Date: 03/19/18   Status: Chronic   


Qualifiers: 


   COPD type: chronic bronchitis   Chronic bronchitis type: mixed simple and 

mucopurulent   Qualified Code(s): J41.8 - Mixed simple and mucopurulent chronic 

bronchitis   





(7) History of CVA (cerebrovascular accident)


Onset Date: 03/19/18   Status: Chronic   





(8) Hypertension


Onset Date: 03/19/18   Status: Chronic   


Qualifiers: 


   Hypertension type: essential hypertension 





(9) Hyperthyroidism


Onset Date: 03/19/18   Status: Chronic   





(10) Obesity


Onset Date: 03/19/18   Status: Chronic   


Qualifiers: 


   Obesity type: due to excess calories   Obesity classification: adult class 3 

(BMI >= 40)   Serious obesity comorbidity presence: with serious comorbidity   

Body mass index: BMI 45.0-49.9   Qualified Code(s): E66.01 - Morbid (severe) 

obesity due to excess calories; Z68.42 - Body mass index (BMI) 45.0-49.9, adult

   


Brief History of Present Illness: 





72-year-old female with significant past medical history of hypertension, AFib, 

COPD, history of CVA, who presented to the ER complaining of having some 

bilateral swelling has gotten worse over 1-2 weeks.  Patient stated that she 

started noticing a healer sore on the right heel which started hurting her more 

and her pain was radiating down from her breast leg all the way up to her 

pelvis area.  Patient does decided to come to the ER to get a further checked 

out.  Patient denies having any nausea vomiting fever chills or any other 

associated symptoms at this time.  Aside from bilateral swelling and pain.  

Patient does not have any other complaints to offer.  Patient is a poor 

historian who live by herself at home and does not have a good followup as 

well.  Patient it was a nursing home for rehab however was discharged from the 

nursing home when she was appropriate from rehab.





In the ER patient was found to have bilateral lower extremity cellulitis with 

mildly elevated white count and thus was referred over for admission for 

further care. 


Hospital Course: 





Overall during the hospital stay patient remained stable





The patient was initially admitted to the hospital for bilateral lower 

extremity cellulitis along with urinary tract infection.  Patient was initially 

kept on IV antibiotics.  Cellulitis had marked improvement.  Patient was found 

to have ESBL in her urine.  PICC line was placed and patient was in need of 

getting IV antibiotics.  Patient is a chronic colonizer of ESBL.  Patient was 

started on IV we are pending here in the hospital and will be continued 

outpatient as well for total of 10 days.  A skilled nursing facility referral 

was made and patient was accepted at [facility] and thus was transferred there 

for further care.  Of note while patient was here in the hospital patient also 

doubled contraction alkalosis secondary to her losartan and Lasix.  Both of 

which were held here in the hospital and patient improved drastically.  Patient 

was asked to stop taking those here in the hospital and at home.  Patient also 

has hypotension while here in the hospital and thus holding her losartan will 

help with her hypotension.


Vital Signs/Physical Exam: 














Temp Pulse Resp BP Pulse Ox


 


 98 F   74   18   100/58 L  93 


 


 05/18/18 11:57  05/18/18 11:57  05/18/18 11:57  05/18/18 11:57  05/18/18 11:57








General: Alert, In no apparent distress


HEENT: Atraumatic, PERRLA, EOMI


Neck: Supple, JVD not distended


Respiratory: Clear to auscultation bilaterally, Normal air movement


Cardiovascular: Regular rate/rhythm, Normal S1 S2


Gastrointestinal: Normal bowel sounds, No tenderness


Musculoskeletal: No tenderness


Integumentary: No rashes


Neurological: Normal speech, Normal tone, Normal affect


Lymphatics: No axilla or inguinal lymphadenopathy


Laboratory Data at Discharge: 














WBC  5.9 K/uL (4.3-10.9)  D 05/16/18  05:00    


 


Hgb  9.6 g/dL (12.0-15.0)  L  05/16/18  05:00    


 


Hct  30.3 % (36.0-45.0)  L  05/16/18  05:00    


 


Plt Count  191 K/uL (152-406)   05/16/18  05:00    


 


Sodium  141 mEq/L (135-145)   05/18/18  04:45    


 


Potassium  3.6 mEq/L (3.6-5.0)   05/18/18  04:45    


 


BUN  32 mg/dL (6-20)  H  05/18/18  04:45    


 


Creatinine  0.63 mg/dL (0.44-1.00)   05/18/18  04:45    


 


Glucose  91 mg/dL ()   05/18/18  04:45    


 


Phosphorus  3.0 mg/dL (2.5-4.3)   05/18/18  04:45    


 


Magnesium  1.7 mg/dL (1.8-2.5)  L  05/18/18  04:45    


 


Total Bilirubin  0.8 mg/dL (0.3-1.2)   05/16/18  05:00    


 


AST  21 IU/L (10-42)   05/16/18  05:00    


 


ALT  13 IU/L (10-60)   05/16/18  05:00    


 


Alkaline Phosphatase  27 IU/L ()  L  05/16/18  05:00    








Home Medications: 








Carvedilol [Coreg*] 25 mg PO BID 09/02/15 


Tramadol HCl [Ultram] 50 mg PO BID PRN 09/02/15 


Isosorbide Mononitrate [Isosorbide Mononitrate ER] 60 mg PO DAILY #30 

tab.er.24h 03/26/18 


Pantoprazole [Protonix Tab*] 40 mg PO DAILYAC #30 tab 03/26/18 


Rivaroxaban [Xarelto*] 20 mg PO DAILY #30 tablet 03/26/18 


Cyanocobalamin (Vitamin B-12) [Vitamin B-12] 1 tab PO DAILY 05/09/18 


Furosemide [Lasix*] 40 mg PO BID 05/09/18 


Gabapentin 600 mg PO QIDP PRN 05/09/18 


Lisinopril/Hydrochlorothiazide [Zestoretic 20-25 mg Tablet] 1 tab PO DAILY 05/09 /18 


Mometasone/Formoterol [Dulera 100 Mcg/5 Mcg Inhaler] 2 puff IH DAILY 05/09/18 


Montelukast Sodium 10 mg PO DAILY 05/09/18 


Potassium Chloride [Klor-Con] 10 meq PO DAILY 05/09/18 


Meropenem [Merrem 1 GM/100 ML NS IVPB] 1 gm IV DAILY #4 bag 05/18/18 





New Medications: 


Meropenem [Merrem 1 GM/100 ML NS IVPB] 1 gm IV DAILY #4 bag


Diet: Regular


Activity: Ad abilio


Followup: 


Jung Mann MD [ACTIVE - CAN ADMIT] -

## 2018-06-16 ENCOUNTER — HOSPITAL ENCOUNTER (EMERGENCY)
Dept: HOSPITAL 97 - ER | Age: 73
LOS: 1 days | Discharge: ANOTHER HOSPITAL | End: 2018-06-17
Payer: COMMERCIAL

## 2018-06-16 DIAGNOSIS — R77.8: ICD-10-CM

## 2018-06-16 DIAGNOSIS — Z88.5: ICD-10-CM

## 2018-06-16 DIAGNOSIS — R79.1: ICD-10-CM

## 2018-06-16 DIAGNOSIS — K92.2: ICD-10-CM

## 2018-06-16 DIAGNOSIS — I50.9: ICD-10-CM

## 2018-06-16 DIAGNOSIS — I10: ICD-10-CM

## 2018-06-16 DIAGNOSIS — N39.0: Primary | ICD-10-CM

## 2018-06-16 LAB
ALBUMIN SERPL BCP-MCNC: 2.4 G/DL (ref 3.2–5.5)
ALP SERPL-CCNC: 27 IU/L (ref 42–121)
ALT SERPL W P-5'-P-CCNC: 12 IU/L (ref 10–60)
AST SERPL W P-5'-P-CCNC: 21 IU/L (ref 10–42)
BUN BLD-MCNC: 66 MG/DL (ref 6–20)
GLUCOSE SERPLBLD-MCNC: 122 MG/DL (ref 65–120)
HCT VFR BLD CALC: 23.8 % (ref 36–45)
HCT VFR BLD CALC: 25 % (ref 36–45)
INR BLD: 4.3
LIPASE SERPL-CCNC: 33 U/L (ref 22–51)
LYMPHOCYTES # SPEC AUTO: 1.5 K/UL (ref 0.7–4.9)
MCH RBC QN AUTO: 27.5 PG (ref 27–35)
MCV RBC: 83.3 FL (ref 80–100)
METHAMPHET UR QL SCN: NEGATIVE
PMV BLD: 9.1 FL (ref 7.6–11.3)
POTASSIUM SERPL-SCNC: 4 MEQ/L (ref 3.6–5)
RBC # BLD: 3.01 M/UL (ref 3.86–4.86)
THC SERPL-MCNC: NEGATIVE NG/ML
UA COMPLETE W REFLEX CULTURE PNL UR: (no result)

## 2018-06-16 PROCEDURE — 82550 ASSAY OF CK (CPK): CPT

## 2018-06-16 PROCEDURE — 96375 TX/PRO/DX INJ NEW DRUG ADDON: CPT

## 2018-06-16 PROCEDURE — 96367 TX/PROPH/DG ADDL SEQ IV INF: CPT

## 2018-06-16 PROCEDURE — 80307 DRUG TEST PRSMV CHEM ANLYZR: CPT

## 2018-06-16 PROCEDURE — 86901 BLOOD TYPING SEROLOGIC RH(D): CPT

## 2018-06-16 PROCEDURE — 96365 THER/PROPH/DIAG IV INF INIT: CPT

## 2018-06-16 PROCEDURE — 86900 BLOOD TYPING SEROLOGIC ABO: CPT

## 2018-06-16 PROCEDURE — 36430 TRANSFUSION BLD/BLD COMPNT: CPT

## 2018-06-16 PROCEDURE — 83880 ASSAY OF NATRIURETIC PEPTIDE: CPT

## 2018-06-16 PROCEDURE — 83605 ASSAY OF LACTIC ACID: CPT

## 2018-06-16 PROCEDURE — 85025 COMPLETE CBC W/AUTO DIFF WBC: CPT

## 2018-06-16 PROCEDURE — 81015 MICROSCOPIC EXAM OF URINE: CPT

## 2018-06-16 PROCEDURE — 87088 URINE BACTERIA CULTURE: CPT

## 2018-06-16 PROCEDURE — 86850 RBC ANTIBODY SCREEN: CPT

## 2018-06-16 PROCEDURE — 85610 PROTHROMBIN TIME: CPT

## 2018-06-16 PROCEDURE — 84145 PROCALCITONIN (PCT): CPT

## 2018-06-16 PROCEDURE — 96366 THER/PROPH/DIAG IV INF ADDON: CPT

## 2018-06-16 PROCEDURE — 80076 HEPATIC FUNCTION PANEL: CPT

## 2018-06-16 PROCEDURE — 85018 HEMOGLOBIN: CPT

## 2018-06-16 PROCEDURE — 87086 URINE CULTURE/COLONY COUNT: CPT

## 2018-06-16 PROCEDURE — 99285 EMERGENCY DEPT VISIT HI MDM: CPT

## 2018-06-16 PROCEDURE — 85014 HEMATOCRIT: CPT

## 2018-06-16 PROCEDURE — 83690 ASSAY OF LIPASE: CPT

## 2018-06-16 PROCEDURE — 81003 URINALYSIS AUTO W/O SCOPE: CPT

## 2018-06-16 PROCEDURE — 80048 BASIC METABOLIC PNL TOTAL CA: CPT

## 2018-06-16 PROCEDURE — 96361 HYDRATE IV INFUSION ADD-ON: CPT

## 2018-06-16 PROCEDURE — 36415 COLL VENOUS BLD VENIPUNCTURE: CPT

## 2018-06-16 PROCEDURE — 84484 ASSAY OF TROPONIN QUANT: CPT

## 2018-06-16 PROCEDURE — 87040 BLOOD CULTURE FOR BACTERIA: CPT

## 2018-06-16 PROCEDURE — 71045 X-RAY EXAM CHEST 1 VIEW: CPT

## 2018-06-16 PROCEDURE — 70450 CT HEAD/BRAIN W/O DYE: CPT

## 2018-06-16 NOTE — RAD REPORT
EXAM DESCRIPTION:  Giuliana Single View6/16/2018 5:32 pm

 

CLINICAL HISTORY:  Chest pain

 

COMPARISON:  May 2018

 

FINDINGS:   The left hemidiaphragm remains elevated.

 

The lungs appear clear of acute infiltrate. The heart is mildly to moderately enlarged

 

IMPRESSION:   No acute abnormalities displayed

## 2018-06-16 NOTE — RAD REPORT
EXAM DESCRIPTION:  CT - Head Brain Wo Cont - 6/16/2018 6:06 pm

 

CLINICAL HISTORY:  Drowsiness

 

COMPARISON:  2008

 

TECHNIQUE:  Computed axial tomography of the head was obtained. IV contrast was not requested.

 

All CT scans are performed using dose optimization technique as appropriate and may include automated
 exposure control or mA/KV adjustment according to patient size.

 

FINDINGS:  An intracranial  bleed is not seen .

 

The ventricles are normal in caliber.

 

No extra-axial fluid collection is noted.

 

Fluid within the sinuses/ mastoids is not seen.

 

IMPRESSION:  No acute intracranial abnormality is seen. If patient's symptoms persist  MRI of the bra
in would be recommended.

## 2018-06-16 NOTE — ER
Nurse's Notes                                                                                     

 [facility]                                                                

Name: Maryellen Nolan                                                                                   

Age: 72 yrs                                                                                       

Sex: Female                                                                                       

: 1945                                                                                   

MRN: [id number]                                                                                   

Arrival Date: 2018                                                                          

Time: 16:13                                                                                       

Account#: [id number]                                                                            

Bed 5                                                                                             

Private MD:                                                                                       

Diagnosis: Urinary tract infection, site not specified;Gastrointestinal hemorrhage,               

  unspecified;Altered mental status, unspecified;Elevated INR;Elevated troponin                   

                                                                                                  

Presentation:                                                                                     

                                                                                             

16:13 Presenting complaint: EMS states: Staff reports that pt recently treated for UTI,       ph  

      decreased appetite x 2 days, AMS that began around noon today, BP low 80s/40s, ,     

      IV established 250 mL of NS given. Transition of care: patient was not received from        

      another setting of care. Onset of symptoms was 2018. Risk Assessment: Do you       

      want to hurt yourself or someone else? Patient reports no desire to harm self or            

      others. Initial Sepsis Screen: Does the patient meet any 2 criteria? Mean Arterial          

      Pressure (MAP) < 65. Altered Mental Status. Yes. Initial Sepsis Screen: Does the            

      patient have a suspected source of infection? Yes: Dysuria/Frequency/Urgency/UTI.           

16:13 Method Of Arrival: EMS: [facility]                                                ph  

16:13 Acuity: MEHUL 2                                                                           ph  

16:44 Care prior to arrival: IV initiated. 20 GA, in the right forearm, Glucose check: 145.   ph  

                                                                                                  

Triage Assessment:                                                                                

16:34 General: Appears in no apparent distress. ill, unkempt, Behavior is cooperative, quiet. aj  

      Pain: Denies pain. Neuro: Level of Consciousness is awake, confused, Oriented to            

      person. Cardiovascular: Edema is 2+ to left midcalf, left ankle, left foot, right           

      midcalf, right ankle and right foot. Respiratory: Airway is patent Respiratory effort       

      is even, unlabored, Respiratory pattern is regular, symmetrical. Derm: Skin is intact,      

      is healthy with good turgor.                                                                

                                                                                                  

Historical:                                                                                       

- Allergies:                                                                                      

16:33 Codeine;                                                                                ph  

- Home Meds:                                                                                      

16:33 furosemide 20 mg oral tab 1 tab once daily [Active]; gabapentin 400 mg oral cap 2 cap 3 ph  

      times per day [Active]; isosorbide mononitrate 60 mg Oral Tb24 1 tab once daily             

      [Active]; carvedilol 25 mg Oral tab 1 tab 2 times per day [Active]; tramadol 50 mg Oral     

      tab 1 tab twice a day [Active]; potassium chloride 10 mEq oral TbER 1 tab once daily        

      [Active]; Diflucan 50 mg Oral tab 1 tab once daily for Candidal Urinary Tract Infection     

      [Active]; zinc sulfate 220 mg Oral tab [Active]; ProMod Protein Oral liqd [Active];         

      ascorbic acid (vitamin C) 500 mg oral tab twice a day [Active]; nitroglycerin 0.4 mg SL     

      subl 1 tab every 5 minutes for Angina [Active]; cyanocobalamin (vitamin B-12) 1,000 mcg     

      oral tab daily [Active]; Dulera 100-5 mcg/actuation inhalation HFAA 2 puffs daily           

      [Active]; montelukast 10 mg oral tab 1 tab once daily [Active]; rivorobaxaban 10 mg         

      daily [Active]; omeprazole 20 mg Oral cpDR 1 cap once daily [Active];                       

- PMHx:                                                                                           

16:33 CHF; CVA; Hypertension; Lupus; neuropathy; UTI; Pressure Ulcer (buttock); Pressure      ph  

      Ulcer (right hip);                                                                          

                                                                                                  

- Immunization history:: Adult Immunizations unknown.                                             

- Social history:: Smoking status: Patient/guardian denies using tobacco.                         

- Ebola Screening: : Patient negative for fever greater than or equal to 101.5 degrees            

  Fahrenheit, and additional compatible Ebola Virus Disease symptoms Patient denies               

  exposure to infectious person Patient denies travel to an Ebola-affected area in the            

  21 days before illness onset No symptoms or risks identified at this time.                      

                                                                                                  

                                                                                                  

Screenin:43 Abuse screen: Denies threats or abuse. Denies injuries from another. Nutritional        ph  

      screening: No deficits noted. Tuberculosis screening: No symptoms or risk factors           

      identified. Fall Risk No fall in past 12 months (0 pts). No secondary diagnosis (0          

      pts). IV access (20 points). Ambulatory Aid- None/Bed Rest/Nurse Assist (0 pts). Gait-      

      Weak (10 pts.). Mental Status- Overestimates/Forgets Limitations (15 pts.). Total Ponce     

      Fall Scale indicates High Risk Score (45 or more points). Fall prevention measures have     

      been instituted. Side Rails Up X 2 Placed Close to Nursing Station Frequent                 

      Obs/Assessments Occuring Family Present and informed to notify staff if the need to         

      leave the bedside As available patient and family educated on Fall Prevention Program       

      and Strategies.                                                                             

20:26 Patient has been NPO before screening. The patient is alert, able to follow commands.   rv  

      The patient does not exhibit slurred or garbled speech The patient is not exhibiting        

      difficulty speaking. The patient does not exhibit difficulty understanding words. The       

      patient is able to swallow own secretions with no drooling or need for suction. Patient     

      tolerated one teaspoon of water. No drooling, immediate coughing, gurgling, or clearing     

      of the throat was noted. The patient tolerated 90mL of water. No drooling, immediate        

      coughing, gurgling, or clearing of the throat was noted. The patient passed the bedside     

      swallow screening. Oral medications may be given as ordered. Contact Physician for          

      further diet orders.                                                                        

                                                                                                  

Assessment:                                                                                       

16:45 General: Appears in no apparent distress. uncomfortable, Behavior is calm, cooperative, ph  

      appropriate for age, Denies fever. Pain: Complains of pain in head. Neuro: Level of         

      Consciousness is awake, obeys commands, Oriented to person, place, situation, pt            

      oriented to person, place, and situation, also recognizes family members but appears        

      slow to respond. Moves all extremities. Pupils are pinpoint, Reports headache.              

      Cardiovascular: Denies chest pain, nausea, shortness of breath, Capillary refill < 3        

      seconds in bilateral fingers Patient's skin is warm and dry. Edema is 3+ to left            

      midcalf, left ankle, left foot, right midcalf, right ankle and right foot. Respiratory:     

      Airway is patent Respiratory effort is even, unlabored, Respiratory pattern is regular,     

      symmetrical. GI: No signs and/or symptoms were reported involving the gastrointestinal      

      system. Derm: Skin is fragile, is thin, Skin is dry, Skin is normal, Skin temperature       

      is warm. Musculoskeletal: Circulation, motion, and sensation intact. Range of motion:       

      intact in all extremities.                                                                  

17:52 Reassessment: Go Henley NP notified of critical lab values, 51.5 PT and INR 4.30.ss  

18:50 Reassessment: Patient appears in no apparent distress at this time. Patient and/or      ph  

      family updated on plan of care and expected duration. Pain level reassessed. Pt awake       

      and alert, oriented to person, place, and situation. Pt straight cathed to obtain urine     

      sample, tolerated well, when inserting catheter pt was noted to have had a bowel            

      movement, soft and brown in appearance, guaiac test preformed per request of ERP,           

      guaiac positive.                                                                            

18:55 Derm: Decubitus located on sacrum approximately 2.6 cm to 7.5 cm is stage III.          ph  

18:56 Derm: Decubitus located on left hip(s) approximately > 20 cm is stage II.               ph  

19:30 Reassessment: Patient appears in no apparent distress at this time. Patient and/or      rv  

      family updated on plan of care and expected duration. Pain level reassessed. received       

      patient alert and awake, lying on bed. vitals are stable.                                   

21:35 Reassessment: Patient appears in no apparent distress at this time. Patient and/or      rv  

      family updated on plan of care and expected duration. Pain level reassessed. awaiting       

      for phlebotomist to extract blood specimen. patient is for blood transfusion.               

23:52 Reassessment: Patient appears in no apparent distress at this time. Patient and/or      rv  

      family updated on plan of care and expected duration. Pain level reassessed. patient is     

      stable and comfortable lying on bed. awake.                                                 

23:53 Reassessment: report given to Anne Carney in [facility] in [place]. awaiting for rv  

      EMS transport.                                                                              

                                                                                             

01:00 Reassessment: blood transfusion completed. patient is stable and comfortable.           rv  

01:43 Reassessment: Patient appears in no apparent distress at this time. Patient and/or      bp  

      family updated on plan of care and expected duration. Pain level reassessed. PATIENT IS     

      COMFORTABLE. ASLEEP. EMS CAME IN TO TRANSFER PT TO [facility].                        

01:56 Reassessment: patient on transport to [facility] via EMS ground.                 rv  

                                                                                                  

Vital Signs:                                                                                      

                                                                                             

16:17  / 44; Pulse 65; Resp 20; Temp 97.8; Pulse Ox 95% on R/A; Weight 79.38 kg; Height aj  

      5 ft. 1 in. (154.94 cm);                                                                    

17:00  / 84; Pulse 64; Resp 16; Pulse Ox 98% on 2 lpm NC;                               ph  

17:30  / 69; Pulse 69; Resp 18; Pulse Ox 99% on 2 lpm NC;                               ph  

18:30 BP 90 / 46; Pulse 62; Resp 18; Pulse Ox 98% on 2 lpm NC;                                ph  

18:52 BP 99 / 54; Pulse 64; Resp 18; Pulse Ox 100% on 2 lpm NC;                               ph  

19:00  / 71; Pulse 74; Resp 19; Pulse Ox 99% ;                                          bp  

20:25  / 66; Pulse 74; Resp 99; Pulse Ox 97% on R/A;                                    rv  

21:36  / 87; Pulse 94; Resp 16; Pulse Ox 96% on R/A;                                    rv  

06                                                                                             

00:22  / 43; Pulse 71; Resp 17; Temp 98.5(TE); Pulse Ox 100% ;                          rv  

01:42 BP 95 / 45; Pulse 74; Resp 18; Pulse Ox 100% on 2 lpm NC;                               bp  

                                                                                             

16:17 Body Mass Index 33.07 (79.38 kg, 154.94 cm)                                               

                                                                                                  

ED Course:                                                                                        

                                                                                             

16:13 Patient arrived in ED.                                                                  ph  

16:17 Triage completed.                                                                       ph  

16:17 Arm band placed on left wrist. Patient placed in an exam room, on a stretcher, on         

      cardiac monitor, on pulse oximetry.                                                         

16:21 Ed Quezada PA is PHCP.                                                               [id number] 

16:21 Raj Vasquez MD is Attending Physician.                                              [id number] 

16:42 Karoline Ramirez RN is Primary Nurse.                                                    ph  

16:44 Patient has correct armband on for positive identification. Bed in low position. Call   ph  

      light in reach. Side rails up X2. Cardiac monitor on. Pulse ox on. NIBP on. Warm            

      blanket given.                                                                              

17:17 Initial lab(s) drawn, by me, sent to lab. First set of blood cultures drawn by me, by   dh3 

      venipuncture 23G to left ac.                                                                

17:28 X-ray completed. Portable x-ray completed in exam room. Patient tolerated procedure     la2 

      well.                                                                                       

17:29 Chest Single View XRAY In Process Unspecified.                                          EDMS

17:45 PHCP role handed off by Ed Quezada PA                                                pm1 

17:45 Go Henley NP is PHCP.                                                           pm1 

17:54 Patient moved to CT.                                                                    [id number] 

18:05 CT Head Brain wo Cont In Process Unspecified.                                           EDMS

22:10 Notified Nurse Practitioner and/or Physician Assistant of a critical lab result(s), Hgb bb  

      of 7.8 P Kale HAMMOND notified.                                                               

23:53 No provider procedures requiring assistance completed.                                  rv  

                                                                                             

01:53 Patient transferred, IV remains in place.                                               bp  

                                                                                                  

Administered Medications:                                                                         

                                                                                             

16:30 Drug: NS 0.9% (30 ml/kg) 30 ml/kg Route: IV; Rate: bolus; Site: right forearm;          ph  

19:03 Drug: NS 0.9% (30 ml/kg) 30 ml/kg Route: IV; Rate: bolus; Site: right wrist;            ph  

                                                                                             

01:55 Follow up: IV Status: Completed infusion                                                bp  

                                                                                             

20:40 Drug: NS 0.9% 250 ml Route: IV; Rate: bolus; Site: right forearm;                       rv  

21:39 Follow up: IV Status: Completed infusion                                                rv  

20:40 Drug: ProTONIX 40 mg Route: IVP; Site: right forearm;                                   rv  

21:38 Follow up: Response: No adverse reaction                                                rv  

20:40 Drug: ProTONIX 8 mg/hr Route: IV; Rate: 25 ml/hr; Site: right forearm;                  rv  

21:38 Follow up: Response: No adverse reaction                                                rv  

                                                                                             

01:54 Follow up: IV Status: Infusion continued upon transfer                                  bp  

                                                                                             

22:00 Drug: Rocephin 1 grams Route: IV; Rate: calculated rate; Site: right forearm;           rv  

                                                                                             

01:54 Follow up: IV Status: Completed infusion                                                bp  

                                                                                                  

                                                                                                  

Outcome:                                                                                          

                                                                                             

22:38 ER care complete, transfer ordered by MD.                                               pm1 

23:54 Transferred by ground EMS to [facility].                             rv  

23:54 Condition: stable                                                                           

23:54 Discharge instructions given to patient, Instructed on the need for transfer.               

                                                                                             

01:57 Patient left the ED.                                                                    rv  

                                                                                                  

Signatures:                                                                                       

Dispatcher [employer]                           EDMS                                                 

Serena Rodriguez RN RN aj Ballard, Brenda, RN RN bb Smirch, Shelby, RN RN ss Roszak, Josh, PA PA   jr8                                                  

Karoline Ramirez RN RN   ph                                                   

Go Henley, MANISH                    NP   pm1                                                  

Charo Buchanan                              3                                                  

Valorie Perez                               la2                                                  

Wojciech Gaines RN RN bp Willis, Michelle                             3                                                  

Gabo Pulliam RN                    RN   rv                                                   

                                                                                                  

Corrections: (The following items were deleted from the chart)                                    

                                                                                             

16:36 16:17  / 44; Pulse 65bpm; Resp 20bpm; Pulse Ox 95% RA; Temp 97.8F; ph             aj  

18:55 18:50 Reassessment: Patient appears in no apparent distress at this time. Patient       ph  

      and/or family updated on plan of care and expected duration. Pain level reassessed. Pt      

      awake and alert, oriented to person, place, and situation. Pt straight cathed to obtain     

      urine sample, tolerated well, when inserting catheter pt was noted to have had a bowel      

      movement, soft and brown in appearance, guaiac test preformed per request of ERP,           

      guaiac positive. ph                                                                         

                                                                                                  

**************************************************************************************************

## 2018-06-16 NOTE — EDPHYS
Physician Documentation                                                                           

 [facility]                                                                

Name: Maryellen Nolan                                                                                   

Age: 72 yrs                                                                                       

Sex: Female                                                                                       

: 1945                                                                                   

MRN: [id number]                                                                                   

Arrival Date: 2018                                                                          

Time: 16:13                                                                                       

Account#: [id number]                                                                            

Bed 5                                                                                             

Private MD:                                                                                       

ED Physician Raj Vasquez                                                                       

HPI:                                                                                              

                                                                                             

17:02 This 72 yrs old Black Female presents to ER via EMS with complaints of Altered Mental   jr8 

      Status.                                                                                     

17:02 The patient presents with confusion, decreased mental status, decreased responsiveness. jr8 

      Onset: The symptoms/episode began/occurred acutely, today. Possible causes: unknown.        

      Associated signs and symptoms: The patient has no apparent associated signs or              

      symptoms. Current symptoms: In the emergency department the patient's symptoms have         

      improved, mildly. Patient's baseline: Neuro: alert and fully oriented, Motor: no            

      deficits, Ambulation: walks without assistance, Speech: normal. The patient has not         

      experienced similar symptoms in the past. The patient has not recently seen a physician.    

                                                                                                  

Historical:                                                                                       

- Allergies:                                                                                      

16:33 Codeine;                                                                                ph  

- Home Meds:                                                                                      

16:33 furosemide 20 mg oral tab 1 tab once daily [Active]; gabapentin 400 mg oral cap 2 cap 3 ph  

      times per day [Active]; isosorbide mononitrate 60 mg Oral Tb24 1 tab once daily             

      [Active]; carvedilol 25 mg Oral tab 1 tab 2 times per day [Active]; tramadol 50 mg Oral     

      tab 1 tab twice a day [Active]; potassium chloride 10 mEq oral TbER 1 tab once daily        

      [Active]; Diflucan 50 mg Oral tab 1 tab once daily for Candidal Urinary Tract Infection     

      [Active]; zinc sulfate 220 mg Oral tab [Active]; ProMod Protein Oral liqd [Active];         

      ascorbic acid (vitamin C) 500 mg oral tab twice a day [Active]; nitroglycerin 0.4 mg SL     

      subl 1 tab every 5 minutes for Angina [Active]; cyanocobalamin (vitamin B-12) 1,000 mcg     

      oral tab daily [Active]; Dulera 100-5 mcg/actuation inhalation HFAA 2 puffs daily           

      [Active]; montelukast 10 mg oral tab 1 tab once daily [Active]; rivorobaxaban 10 mg         

      daily [Active]; omeprazole 20 mg Oral cpDR 1 cap once daily [Active];                       

- PMHx:                                                                                           

16:33 CHF; CVA; Hypertension; Lupus; neuropathy; UTI; Pressure Ulcer (buttock); Pressure      ph  

      Ulcer (right hip);                                                                          

                                                                                                  

- Immunization history:: Adult Immunizations unknown.                                             

- Social history:: Smoking status: Patient/guardian denies using tobacco.                         

- Ebola Screening: : Patient negative for fever greater than or equal to 101.5 degrees            

  Fahrenheit, and additional compatible Ebola Virus Disease symptoms Patient denies               

  exposure to infectious person Patient denies travel to an Ebola-affected area in the            

  21 days before illness onset No symptoms or risks identified at this time.                      

                                                                                                  

                                                                                                  

ROS:                                                                                              

17:02 Eyes: Negative for injury, pain, redness, and discharge, ENT: Negative for injury,      jr8 

      pain, and discharge, Neck: Negative for injury, pain, and swelling, Cardiovascular:         

      Negative for chest pain, palpitations, and edema, Respiratory: Negative for shortness       

      of breath, cough, wheezing, and pleuritic chest pain, Abdomen/GI: Negative for              

      abdominal pain, nausea, vomiting, diarrhea, and constipation, Back: Negative for injury     

      and pain, MS/Extremity: Negative for injury and deformity, Skin: Negative for injury,       

      rash, and discoloration.                                                                    

17:02 Neuro: Positive for altered mental status, weakness.                                        

                                                                                                  

Exam:                                                                                             

17:02 Head/Face:  Normocephalic, atraumatic. ENT:  Nares patent. No nasal discharge, no       jr8 

      septal abnormalities noted.  Tympanic membranes are normal and external auditory canals     

      are clear.  Oropharynx with no redness, swelling, or masses, exudates, or evidence of       

      obstruction, uvula midline.  Mucous membranes moist. Neck:  Trachea midline, no             

      thyromegaly or masses palpated, and no cervical lymphadenopathy.  Supple, full range of     

      motion without nuchal rigidity, or vertebral point tenderness.  No Meningismus.             

      Chest/axilla:  Normal chest wall appearance and motion.  Nontender with no deformity.       

      No lesions are appreciated. Cardiovascular:  Regular rate and rhythm with a normal S1       

      and S2.  No gallops, murmurs, or rubs.  Normal PMI, no JVD.  No pulse deficits.             

      Respiratory:  Lungs have equal breath sounds bilaterally, clear to auscultation and         

      percussion.  No rales, rhonchi or wheezes noted.  No increased work of breathing, no        

      retractions or nasal flaring. Abdomen/GI:  Soft, non-tender, with normal bowel sounds.      

      No distension or tympany.  No guarding or rebound.  No evidence of tenderness               

      throughout. Back:  No spinal tenderness.  No costovertebral tenderness.  Full range of      

      motion. Skin:  Warm, dry with normal turgor.  Normal color with no rashes, no lesions,      

      and no evidence of cellulitis. MS/ Extremity:  Pulses equal, no cyanosis.                   

      Neurovascular intact.  Full, normal range of motion.                                        

17:02 Eyes: Periorbital structures: appear normal, Pupils: equal, round, and reactive to          

      light and accomodation, right pupil is approximately  2 mm(s), left pupil is                

      approximately  2 mm(s), Extraocular movements: intact throughout, Conjunctiva: normal,      

      Corneas: are normal, Sclera: no appreciated abnormality, Anterior chamber: normal, Lids     

      and lashes: appear normal.                                                                  

17:02 Neuro: Orientation: to person, place, situation, Mentation: able to follow commands,        

      slow to respond, Memory: is normal, Cranial nerves: grossly normal, Cerebellar              

      function: is grossly normal, Motor: moves all fours, strength is 5/5 in all                 

      extremities, Sensation: is normal, Gait: not tested. seizure activity, is not displayed     

      by the patient, Abnormal movements: there are no abnormal movements.                        

19:12 Abdomen/GI: Rectal exam: Stool: guaiac positive, loose, dark brown .                    pm1 

19:12 Skin: Large stage 1/2 right hip and gluteus decubitus.  Sacral stage 3 decubitus.       pm1 

                                                                                                  

Vital Signs:                                                                                      

16:17  / 44; Pulse 65; Resp 20; Temp 97.8; Pulse Ox 95% on R/A; Weight 79.38 kg; Height aj  

      5 ft. 1 in. (154.94 cm);                                                                    

17:00  / 84; Pulse 64; Resp 16; Pulse Ox 98% on 2 lpm NC;                               ph  

17:30  / 69; Pulse 69; Resp 18; Pulse Ox 99% on 2 lpm NC;                               ph  

18:30 BP 90 / 46; Pulse 62; Resp 18; Pulse Ox 98% on 2 lpm NC;                                ph  

18:52 BP 99 / 54; Pulse 64; Resp 18; Pulse Ox 100% on 2 lpm NC;                               ph  

19:00  / 71; Pulse 74; Resp 19; Pulse Ox 99% ;                                          bp  

20:25  / 66; Pulse 74; Resp 99; Pulse Ox 97% on R/A;                                    rv  

21:36  / 87; Pulse 94; Resp 16; Pulse Ox 96% on R/A;                                    rv  

                                                                                             

00:22  / 43; Pulse 71; Resp 17; Temp 98.5(TE); Pulse Ox 100% ;                          rv  

01:42 BP 95 / 45; Pulse 74; Resp 18; Pulse Ox 100% on 2 lpm NC;                               bp  

                                                                                             

16:17 Body Mass Index 33.07 (79.38 kg, 154.94 cm)                                             aj  

                                                                                                  

MDM:                                                                                              

                                                                                             

16:21 Patient medically screened.                                                              

22:26 Data reviewed: vital signs. Data interpreted: Pulse oximetry: on room air is 96 %.      pm1 

      Interpretation: normal. Counseling: I had a detailed discussion with the patient and/or     

      guardian regarding: the historical points, exam findings, and any diagnostic results        

      supporting the discharge/admit diagnosis, lab results, radiology results, the need for      

      outpatient follow up, to return to the emergency department if symptoms worsen or           

      persist or if there are any questions or concerns that arise at home.                       

22:30 Physician consultation: MD Neri was contacted at 22:30, regarding admission, patient's pm1 

      condition, and will see patient Dr. Angelita SILVA plans to scope patient in AM tomorrow.          

                                                                                                  

                                                                                             

16:21 Order name: Urine Microscopic Only; Complete Time: 19:38                                                                                                                             

16:21 Order name: CPK; Complete Time: 18:14                                                                                                                                                

16:21 Order name: Basic Metabolic Panel; Complete Time: 18:14                                                                                                                              

16:21 Order name: Blood Culture Adult (2)                                                                                                                                                  

16:21 Order name: BNP; Complete Time: 18:14                                                                                                                                                

16:21 Order name: CBC with Diff; Complete Time: 17:43                                                                                                                                      

16:21 Order name: Lactate; Complete Time: 18:14                                                                                                                                            

16:21 Order name: LFT's; Complete Time: 18:14                                                                                                                                              

16:21 Order name: Lipase; Complete Time: 18:14                                                                                                                                             

16:21 Order name: Procalcitonin; Complete Time: 19:38                                                                                                                                      

16:21 Order name: Protime (+inr); Complete Time: 17:54                                        jr8 

06/16                                                                                             

16:21 Order name: Troponin (emerg Dept Use Only); Complete Time: 18:14                        [facility] 

                                                                                             

17:09 Order name: UDS; Complete Time: 19:38                                                   [facility] 

                                                                                             

18:55 Order name: Urine Dipstick--Ancillary (enter results); Complete Time: 19:38             [facility] 

                                                                                             

16:21 Order name: Cath; Complete Time: 19:04                                                  [facility] 

                                                                                             

16:21 Order name: Chest Single View XRAY; Complete Time: 17:44                                [facility] 

                                                                                             

17:46 Order name: CT Head Brain wo Cont; Complete Time: 18:15                                 8 

                                                                                             

18:57 Order name: Type And Screen                                                             [facility] 

                                                                                             

19:23 Order name: Urine Culture                                                               [facility]

                                                                                             

19:39 Order name: Hemoglobin                                                                  [facility] 

                                                                                             

19:39 Order name: Hematocrit                                                                  [facility] 

                                                                                             

19:40 Order name: Hemoglobin; Complete Time: 22:54                                            [facility]

                                                                                             

19:40 Order name: Hematocrit; Complete Time: 22:54                                            [facility]

                                                                                             

21:06 Order name: Packed RBC Leukored AS-1                                                    [facility]

                                                                                             

21:08 Order name: Bb Add On                                                                   eb  

                                                                                             

16:21 Order name: Accucheck; Complete Time: 16:45                                             [facility] 

                                                                                             

16:21 Order name: Cardiac monitoring; Complete Time: 16:45                                    [facility] 

                                                                                             

16:21 Order name: EKG - Nurse/Tech; Complete Time: 19:04                                      [facility] 

                                                                                             

16:21 Order name: IV Saline Lock - Large Bore; Complete Time: 16:45                           [facility] 

                                                                                             

16:21 Order name: Labs collected and sent; Complete Time: 17:17                               [facility] 

                                                                                             

16:21 Order name: O2 Per Protocol; Complete Time: 16:45                                       [facility] 

                                                                                             

16:21 Order name: O2 Sat Monitoring; Complete Time: 16:45                                     [facility] 

                                                                                             

16:21 Order name: Urine Dipstick-Ancillary (obtain specimen); Complete Time: 18:53            [facility] 

                                                                                             

17:46 Order name: Swallow Screen; Complete Time: 22:07                                        [facility] 

                                                                                                  

Administered Medications:                                                                         

16:30 Drug: NS 0.9% (30 ml/kg) 30 ml/kg Route: IV; Rate: bolus; Site: right forearm;          ph  

19:03 Drug: NS 0.9% (30 ml/kg) 30 ml/kg Route: IV; Rate: bolus; Site: right wrist;            ph  

                                                                                             

01:55 Follow up: IV Status: Completed infusion                                                bp  

                                                                                             

20:40 Drug: NS 0.9% 250 ml Route: IV; Rate: bolus; Site: right forearm;                       rv  

21:39 Follow up: IV Status: Completed infusion                                                rv  

20:40 Drug: ProTONIX 40 mg Route: IVP; Site: right forearm;                                   rv  

21:38 Follow up: Response: No adverse reaction                                                rv  

20:40 Drug: ProTONIX 8 mg/hr Route: IV; Rate: 25 ml/hr; Site: right forearm;                  rv  

21:38 Follow up: Response: No adverse reaction                                                rv  

                                                                                             

01:54 Follow up: IV Status: Infusion continued upon transfer                                  bp  

                                                                                             

22:00 Drug: Rocephin 1 grams Route: IV; Rate: calculated rate; Site: right forearm;           rv  

                                                                                             

01:54 Follow up: IV Status: Completed infusion                                                bp  

                                                                                                  

                                                                                                  

Disposition:                                                                                      

                                                                                             

19:09 Co-signature as Attending Physician, Raj Vasquez MD.                                    

                                                                                                  

Disposition:                                                                                      

18 22:38 Transfer ordered to [facility]. Diagnosis are                  

  Gastrointestinal hemorrhage, unspecified, Urinary tract infection, site not                     

  specified, Altered mental status, unspecified, Elevated INR, Elevated                           

  troponin.                                                                                       

- Reason for transfer: Higher level of care.                                                      

- [facility]                                                  

- Condition is Stable.                                                                            

- Problem is new.                                                                                 

- Symptoms have improved.                                                                         

                                                                                                  

                                                                                                  

                                                                                                  

Signatures:                                                                                       

Dispatcher [employer]                           EDMS                                                 

Ed Quezada PA PA   jr8                                                  

Karoline Ramirez RN                      RN                                                      

Go Henley, NP                    NP   pm1                                                  

Raj Vasquez MD MD                                                      

Gabo Pulliam RN RN   rv                                                   

Wojciech Gaines RN   bp                                                   

                                                                                                  

Corrections: (The following items were deleted from the chart)                                    

                                                                                             

01:57  22:38 2018 22:38 Transfer ordered to [facility].        rv  

      Diagnosis is Gastrointestinal hemorrhage, unspecifiedUrinary tract infection, site not      

      specified; Altered mental status, unspecified; Elevated INR; Elevated troponin. Reason      

      for transfer: Higher level of care. [facility] physician [facility]          

      Condition is Stable. Problem is new. Symptoms have improved. pm1                            

                                                                                                  

**************************************************************************************************

## 2018-06-17 VITALS — TEMPERATURE: 98.5 F | OXYGEN SATURATION: 100 %

## 2018-06-17 VITALS — DIASTOLIC BLOOD PRESSURE: 45 MMHG | SYSTOLIC BLOOD PRESSURE: 95 MMHG

## 2018-06-17 PROCEDURE — 30233N1 TRANSFUSION OF NONAUTOLOGOUS RED BLOOD CELLS INTO PERIPHERAL VEIN, PERCUTANEOUS APPROACH: ICD-10-PCS
